# Patient Record
Sex: FEMALE | Race: WHITE | Employment: UNEMPLOYED | URBAN - METROPOLITAN AREA
[De-identification: names, ages, dates, MRNs, and addresses within clinical notes are randomized per-mention and may not be internally consistent; named-entity substitution may affect disease eponyms.]

---

## 2020-04-26 ENCOUNTER — HOSPITAL ENCOUNTER (EMERGENCY)
Age: 35
Discharge: HOME | End: 2020-04-26
Attending: EMERGENCY MEDICINE
Payer: MEDICAID

## 2020-04-26 VITALS
SYSTOLIC BLOOD PRESSURE: 152 MMHG | TEMPERATURE: 98.9 F | DIASTOLIC BLOOD PRESSURE: 117 MMHG | HEART RATE: 98 BPM | OXYGEN SATURATION: 99 % | WEIGHT: 144 LBS | RESPIRATION RATE: 18 BRPM | BODY MASS INDEX: 24.59 KG/M2 | HEIGHT: 64 IN

## 2020-04-26 PROCEDURE — 6370000000 HC RX 637 (ALT 250 FOR IP): Performed by: EMERGENCY MEDICINE

## 2020-04-26 PROCEDURE — 99282 EMERGENCY DEPT VISIT SF MDM: CPT

## 2020-04-26 RX ORDER — HYDROCODONE BITARTRATE AND ACETAMINOPHEN 5; 325 MG/1; MG/1
1 TABLET ORAL ONCE
Status: COMPLETED | OUTPATIENT
Start: 2020-04-26 | End: 2020-04-26

## 2020-04-26 RX ORDER — AMOXICILLIN AND CLAVULANATE POTASSIUM 875; 125 MG/1; MG/1
1 TABLET, FILM COATED ORAL 2 TIMES DAILY
Qty: 20 TABLET | Refills: 0 | Status: SHIPPED | OUTPATIENT
Start: 2020-04-26 | End: 2020-05-06

## 2020-04-26 RX ORDER — HYDROCHLOROTHIAZIDE 25 MG/1
25 TABLET ORAL PRN
Status: ON HOLD | COMMUNITY
End: 2020-07-03 | Stop reason: ALTCHOICE

## 2020-04-26 RX ORDER — AMOXICILLIN AND CLAVULANATE POTASSIUM 875; 125 MG/1; MG/1
1 TABLET, FILM COATED ORAL ONCE
Status: COMPLETED | OUTPATIENT
Start: 2020-04-26 | End: 2020-04-26

## 2020-04-26 RX ORDER — VENLAFAXINE 75 MG/1
75 TABLET ORAL DAILY
Status: ON HOLD | COMMUNITY
End: 2020-07-03 | Stop reason: ALTCHOICE

## 2020-04-26 RX ADMIN — HYDROCODONE BITARTRATE AND ACETAMINOPHEN 1 TABLET: 5; 325 TABLET ORAL at 21:17

## 2020-04-26 RX ADMIN — AMOXICILLIN AND CLAVULANATE POTASSIUM 1 TABLET: 875; 125 TABLET, FILM COATED ORAL at 21:17

## 2020-04-26 SDOH — HEALTH STABILITY: MENTAL HEALTH: HOW OFTEN DO YOU HAVE A DRINK CONTAINING ALCOHOL?: NEVER

## 2020-04-26 ASSESSMENT — PAIN DESCRIPTION - LOCATION
LOCATION: MOUTH
LOCATION: MOUTH

## 2020-04-26 ASSESSMENT — PAIN DESCRIPTION - ONSET: ONSET: PROGRESSIVE

## 2020-04-26 ASSESSMENT — PAIN DESCRIPTION - DESCRIPTORS: DESCRIPTORS: ACHING

## 2020-04-26 ASSESSMENT — PAIN SCALES - GENERAL
PAINLEVEL_OUTOF10: 8
PAINLEVEL_OUTOF10: 8

## 2020-04-26 ASSESSMENT — PAIN DESCRIPTION - PROGRESSION: CLINICAL_PROGRESSION: GRADUALLY WORSENING

## 2020-04-26 ASSESSMENT — PAIN DESCRIPTION - ORIENTATION
ORIENTATION: LEFT;LOWER
ORIENTATION: LEFT;LOWER

## 2020-04-26 ASSESSMENT — PAIN DESCRIPTION - FREQUENCY: FREQUENCY: CONTINUOUS

## 2020-04-26 ASSESSMENT — PAIN DESCRIPTION - DIRECTION: RADIATING_TOWARDS: NON RADIATING

## 2020-04-26 ASSESSMENT — PAIN DESCRIPTION - PAIN TYPE
TYPE: ACUTE PAIN
TYPE: ACUTE PAIN

## 2020-04-26 ASSESSMENT — PAIN - FUNCTIONAL ASSESSMENT: PAIN_FUNCTIONAL_ASSESSMENT: PREVENTS OR INTERFERES SOME ACTIVE ACTIVITIES AND ADLS

## 2020-06-30 ASSESSMENT — PAIN DESCRIPTION - PAIN TYPE: TYPE: ACUTE PAIN

## 2020-06-30 ASSESSMENT — PAIN DESCRIPTION - DESCRIPTORS: DESCRIPTORS: RADIATING

## 2020-06-30 ASSESSMENT — PAIN DESCRIPTION - FREQUENCY: FREQUENCY: CONTINUOUS

## 2020-06-30 ASSESSMENT — PAIN DESCRIPTION - PROGRESSION: CLINICAL_PROGRESSION: GRADUALLY WORSENING

## 2020-06-30 ASSESSMENT — PAIN DESCRIPTION - ORIENTATION: ORIENTATION: LOWER

## 2020-06-30 ASSESSMENT — PAIN SCALES - GENERAL: PAINLEVEL_OUTOF10: 9

## 2020-06-30 ASSESSMENT — PAIN DESCRIPTION - ONSET: ONSET: ON-GOING

## 2020-06-30 ASSESSMENT — PAIN DESCRIPTION - LOCATION: LOCATION: BACK

## 2020-07-01 ENCOUNTER — APPOINTMENT (OUTPATIENT)
Dept: GENERAL RADIOLOGY | Age: 35
DRG: 720 | End: 2020-07-01
Payer: MEDICAID

## 2020-07-01 ENCOUNTER — APPOINTMENT (OUTPATIENT)
Dept: CT IMAGING | Age: 35
DRG: 720 | End: 2020-07-01
Payer: MEDICAID

## 2020-07-01 ENCOUNTER — HOSPITAL ENCOUNTER (INPATIENT)
Age: 35
LOS: 4 days | Discharge: HOME | DRG: 720 | End: 2020-07-05
Attending: EMERGENCY MEDICINE | Admitting: INTERNAL MEDICINE
Payer: MEDICAID

## 2020-07-01 PROBLEM — M46.40 DISCITIS: Status: ACTIVE | Noted: 2020-07-01

## 2020-07-01 LAB
A/G RATIO: 0.8 (ref 1.1–2.2)
ALBUMIN SERPL-MCNC: 3.5 G/DL (ref 3.4–5)
ALP BLD-CCNC: 97 U/L (ref 40–129)
ALT SERPL-CCNC: 11 U/L (ref 10–40)
ANION GAP SERPL CALCULATED.3IONS-SCNC: 15 MMOL/L (ref 3–16)
AST SERPL-CCNC: 13 U/L (ref 15–37)
BASOPHILS ABSOLUTE: 0 K/UL (ref 0–0.2)
BASOPHILS RELATIVE PERCENT: 0.4 %
BILIRUB SERPL-MCNC: <0.2 MG/DL (ref 0–1)
BUN BLDV-MCNC: 13 MG/DL (ref 7–20)
CALCIUM SERPL-MCNC: 8.8 MG/DL (ref 8.3–10.6)
CHLORIDE BLD-SCNC: 99 MMOL/L (ref 99–110)
CO2: 23 MMOL/L (ref 21–32)
CREAT SERPL-MCNC: 0.6 MG/DL (ref 0.6–1.1)
EKG ATRIAL RATE: 65 BPM
EKG DIAGNOSIS: NORMAL
EKG P AXIS: 75 DEGREES
EKG P-R INTERVAL: 108 MS
EKG Q-T INTERVAL: 458 MS
EKG QRS DURATION: 98 MS
EKG QTC CALCULATION (BAZETT): 476 MS
EKG R AXIS: 53 DEGREES
EKG T AXIS: 48 DEGREES
EKG VENTRICULAR RATE: 65 BPM
EOSINOPHILS ABSOLUTE: 0.4 K/UL (ref 0–0.6)
EOSINOPHILS RELATIVE PERCENT: 3.1 %
GFR AFRICAN AMERICAN: >60
GFR NON-AFRICAN AMERICAN: >60
GLOBULIN: 4.4 G/DL
GLUCOSE BLD-MCNC: 134 MG/DL (ref 70–99)
HCG QUALITATIVE: NEGATIVE
HCT VFR BLD CALC: 40.5 % (ref 36–48)
HEMOGLOBIN: 13.2 G/DL (ref 12–16)
LACTIC ACID, SEPSIS: 2.3 MMOL/L (ref 0.4–1.9)
LACTIC ACID: 0.8 MMOL/L (ref 0.4–2)
LACTIC ACID: 0.9 MMOL/L (ref 0.4–2)
LACTIC ACID: 1.1 MMOL/L (ref 0.4–2)
LYMPHOCYTES ABSOLUTE: 3.4 K/UL (ref 1–5.1)
LYMPHOCYTES RELATIVE PERCENT: 27 %
MAGNESIUM: 2.3 MG/DL (ref 1.8–2.4)
MCH RBC QN AUTO: 28.1 PG (ref 26–34)
MCHC RBC AUTO-ENTMCNC: 32.6 G/DL (ref 31–36)
MCV RBC AUTO: 86.3 FL (ref 80–100)
MONOCYTES ABSOLUTE: 0.9 K/UL (ref 0–1.3)
MONOCYTES RELATIVE PERCENT: 7.4 %
NEUTROPHILS ABSOLUTE: 7.8 K/UL (ref 1.7–7.7)
NEUTROPHILS RELATIVE PERCENT: 62.1 %
PDW BLD-RTO: 13 % (ref 12.4–15.4)
PLATELET # BLD: 352 K/UL (ref 135–450)
PMV BLD AUTO: 8.9 FL (ref 5–10.5)
POTASSIUM REFLEX MAGNESIUM: 3.2 MMOL/L (ref 3.5–5.1)
PROCALCITONIN: 0.21 NG/ML (ref 0–0.15)
RBC # BLD: 4.69 M/UL (ref 4–5.2)
SEDIMENTATION RATE, ERYTHROCYTE: 38 MM/HR (ref 0–20)
SODIUM BLD-SCNC: 137 MMOL/L (ref 136–145)
TOTAL PROTEIN: 7.9 G/DL (ref 6.4–8.2)
WBC # BLD: 12.5 K/UL (ref 4–11)

## 2020-07-01 PROCEDURE — 6360000002 HC RX W HCPCS: Performed by: NURSE PRACTITIONER

## 2020-07-01 PROCEDURE — 71045 X-RAY EXAM CHEST 1 VIEW: CPT

## 2020-07-01 PROCEDURE — 2580000003 HC RX 258: Performed by: INTERNAL MEDICINE

## 2020-07-01 PROCEDURE — 2709999900 CT PERC ASP PARAVERT TISS/NUC PULP/INTERVERT DISC

## 2020-07-01 PROCEDURE — 96365 THER/PROPH/DIAG IV INF INIT: CPT

## 2020-07-01 PROCEDURE — 2580000003 HC RX 258: Performed by: NURSE PRACTITIONER

## 2020-07-01 PROCEDURE — 87070 CULTURE OTHR SPECIMN AEROBIC: CPT

## 2020-07-01 PROCEDURE — 87205 SMEAR GRAM STAIN: CPT

## 2020-07-01 PROCEDURE — 83605 ASSAY OF LACTIC ACID: CPT

## 2020-07-01 PROCEDURE — 6360000002 HC RX W HCPCS: Performed by: INTERNAL MEDICINE

## 2020-07-01 PROCEDURE — 87040 BLOOD CULTURE FOR BACTERIA: CPT

## 2020-07-01 PROCEDURE — 83735 ASSAY OF MAGNESIUM: CPT

## 2020-07-01 PROCEDURE — 6360000002 HC RX W HCPCS: Performed by: RADIOLOGY

## 2020-07-01 PROCEDURE — 80053 COMPREHEN METABOLIC PANEL: CPT

## 2020-07-01 PROCEDURE — 1200000000 HC SEMI PRIVATE

## 2020-07-01 PROCEDURE — 96375 TX/PRO/DX INJ NEW DRUG ADDON: CPT

## 2020-07-01 PROCEDURE — 84145 PROCALCITONIN (PCT): CPT

## 2020-07-01 PROCEDURE — 6360000002 HC RX W HCPCS: Performed by: EMERGENCY MEDICINE

## 2020-07-01 PROCEDURE — 72132 CT LUMBAR SPINE W/DYE: CPT

## 2020-07-01 PROCEDURE — 36415 COLL VENOUS BLD VENIPUNCTURE: CPT

## 2020-07-01 PROCEDURE — 0QB03ZX EXCISION OF LUMBAR VERTEBRA, PERCUTANEOUS APPROACH, DIAGNOSTIC: ICD-10-PCS | Performed by: RADIOLOGY

## 2020-07-01 PROCEDURE — 85652 RBC SED RATE AUTOMATED: CPT

## 2020-07-01 PROCEDURE — 0QB13ZX EXCISION OF SACRUM, PERCUTANEOUS APPROACH, DIAGNOSTIC: ICD-10-PCS | Performed by: RADIOLOGY

## 2020-07-01 PROCEDURE — 87186 SC STD MICRODIL/AGAR DIL: CPT

## 2020-07-01 PROCEDURE — 6360000002 HC RX W HCPCS: Performed by: FAMILY MEDICINE

## 2020-07-01 PROCEDURE — 85025 COMPLETE CBC W/AUTO DIFF WBC: CPT

## 2020-07-01 PROCEDURE — 77012 CT SCAN FOR NEEDLE BIOPSY: CPT

## 2020-07-01 PROCEDURE — 84703 CHORIONIC GONADOTROPIN ASSAY: CPT

## 2020-07-01 PROCEDURE — 6370000000 HC RX 637 (ALT 250 FOR IP): Performed by: FAMILY MEDICINE

## 2020-07-01 PROCEDURE — 87077 CULTURE AEROBIC IDENTIFY: CPT

## 2020-07-01 PROCEDURE — 6360000004 HC RX CONTRAST MEDICATION: Performed by: EMERGENCY MEDICINE

## 2020-07-01 PROCEDURE — 87075 CULTR BACTERIA EXCEPT BLOOD: CPT

## 2020-07-01 PROCEDURE — 93010 ELECTROCARDIOGRAM REPORT: CPT | Performed by: INTERNAL MEDICINE

## 2020-07-01 PROCEDURE — 93005 ELECTROCARDIOGRAM TRACING: CPT | Performed by: NURSE PRACTITIONER

## 2020-07-01 PROCEDURE — 99285 EMERGENCY DEPT VISIT HI MDM: CPT

## 2020-07-01 PROCEDURE — 99255 IP/OBS CONSLTJ NEW/EST HI 80: CPT | Performed by: INTERNAL MEDICINE

## 2020-07-01 RX ORDER — KETOROLAC TROMETHAMINE 15 MG/ML
15 INJECTION, SOLUTION INTRAMUSCULAR; INTRAVENOUS EVERY 6 HOURS PRN
Status: DISCONTINUED | OUTPATIENT
Start: 2020-07-01 | End: 2020-07-05 | Stop reason: HOSPADM

## 2020-07-01 RX ORDER — KETOROLAC TROMETHAMINE 30 MG/ML
30 INJECTION, SOLUTION INTRAMUSCULAR; INTRAVENOUS EVERY 6 HOURS PRN
Status: DISCONTINUED | OUTPATIENT
Start: 2020-07-01 | End: 2020-07-01

## 2020-07-01 RX ORDER — 0.9 % SODIUM CHLORIDE 0.9 %
30 INTRAVENOUS SOLUTION INTRAVENOUS ONCE
Status: COMPLETED | OUTPATIENT
Start: 2020-07-01 | End: 2020-07-01

## 2020-07-01 RX ORDER — MORPHINE SULFATE 2 MG/ML
2 INJECTION, SOLUTION INTRAMUSCULAR; INTRAVENOUS ONCE
Status: COMPLETED | OUTPATIENT
Start: 2020-07-01 | End: 2020-07-01

## 2020-07-01 RX ORDER — MIDAZOLAM HYDROCHLORIDE 1 MG/ML
INJECTION INTRAMUSCULAR; INTRAVENOUS DAILY PRN
Status: COMPLETED | OUTPATIENT
Start: 2020-07-01 | End: 2020-07-01

## 2020-07-01 RX ORDER — LORAZEPAM 2 MG/ML
INJECTION INTRAMUSCULAR DAILY PRN
Status: COMPLETED | OUTPATIENT
Start: 2020-07-01 | End: 2020-07-01

## 2020-07-01 RX ORDER — SODIUM CHLORIDE 9 MG/ML
INJECTION, SOLUTION INTRAVENOUS CONTINUOUS
Status: DISCONTINUED | OUTPATIENT
Start: 2020-07-01 | End: 2020-07-05 | Stop reason: HOSPADM

## 2020-07-01 RX ORDER — METAXALONE 800 MG/1
800 TABLET ORAL EVERY 8 HOURS PRN
Status: DISCONTINUED | OUTPATIENT
Start: 2020-07-01 | End: 2020-07-04

## 2020-07-01 RX ORDER — LABETALOL HYDROCHLORIDE 5 MG/ML
10 INJECTION, SOLUTION INTRAVENOUS EVERY 4 HOURS PRN
Status: DISCONTINUED | OUTPATIENT
Start: 2020-07-01 | End: 2020-07-05 | Stop reason: HOSPADM

## 2020-07-01 RX ORDER — SODIUM CHLORIDE 0.9 % (FLUSH) 0.9 %
10 SYRINGE (ML) INJECTION PRN
Status: DISCONTINUED | OUTPATIENT
Start: 2020-07-01 | End: 2020-07-05 | Stop reason: HOSPADM

## 2020-07-01 RX ORDER — ACETAMINOPHEN 500 MG
1000 TABLET ORAL EVERY 8 HOURS SCHEDULED
Status: DISCONTINUED | OUTPATIENT
Start: 2020-07-01 | End: 2020-07-02

## 2020-07-01 RX ORDER — KETOROLAC TROMETHAMINE 30 MG/ML
30 INJECTION, SOLUTION INTRAMUSCULAR; INTRAVENOUS ONCE
Status: COMPLETED | OUTPATIENT
Start: 2020-07-01 | End: 2020-07-01

## 2020-07-01 RX ORDER — ACETAMINOPHEN 325 MG/1
650 TABLET ORAL EVERY 4 HOURS PRN
Status: DISCONTINUED | OUTPATIENT
Start: 2020-07-01 | End: 2020-07-01

## 2020-07-01 RX ORDER — POTASSIUM CHLORIDE 750 MG/1
40 TABLET, FILM COATED, EXTENDED RELEASE ORAL ONCE
Status: COMPLETED | OUTPATIENT
Start: 2020-07-01 | End: 2020-07-01

## 2020-07-01 RX ORDER — MORPHINE SULFATE 4 MG/ML
4 INJECTION, SOLUTION INTRAMUSCULAR; INTRAVENOUS EVERY 4 HOURS PRN
Status: DISCONTINUED | OUTPATIENT
Start: 2020-07-01 | End: 2020-07-03

## 2020-07-01 RX ORDER — ONDANSETRON 2 MG/ML
4 INJECTION INTRAMUSCULAR; INTRAVENOUS EVERY 4 HOURS PRN
Status: DISCONTINUED | OUTPATIENT
Start: 2020-07-01 | End: 2020-07-05 | Stop reason: HOSPADM

## 2020-07-01 RX ORDER — LORAZEPAM 1 MG/1
1 TABLET ORAL EVERY 4 HOURS PRN
Status: DISCONTINUED | OUTPATIENT
Start: 2020-07-01 | End: 2020-07-04

## 2020-07-01 RX ORDER — ACETAMINOPHEN 650 MG/1
650 SUPPOSITORY RECTAL EVERY 4 HOURS PRN
Status: DISCONTINUED | OUTPATIENT
Start: 2020-07-01 | End: 2020-07-01

## 2020-07-01 RX ORDER — SODIUM CHLORIDE 0.9 % (FLUSH) 0.9 %
10 SYRINGE (ML) INJECTION EVERY 12 HOURS SCHEDULED
Status: DISCONTINUED | OUTPATIENT
Start: 2020-07-01 | End: 2020-07-05 | Stop reason: HOSPADM

## 2020-07-01 RX ORDER — GABAPENTIN 100 MG/1
100 CAPSULE ORAL 3 TIMES DAILY
Status: DISCONTINUED | OUTPATIENT
Start: 2020-07-01 | End: 2020-07-02

## 2020-07-01 RX ORDER — FENTANYL CITRATE 50 UG/ML
INJECTION, SOLUTION INTRAMUSCULAR; INTRAVENOUS DAILY PRN
Status: COMPLETED | OUTPATIENT
Start: 2020-07-01 | End: 2020-07-01

## 2020-07-01 RX ORDER — POLYETHYLENE GLYCOL 3350 17 G/17G
17 POWDER, FOR SOLUTION ORAL DAILY PRN
Status: DISCONTINUED | OUTPATIENT
Start: 2020-07-01 | End: 2020-07-05 | Stop reason: HOSPADM

## 2020-07-01 RX ORDER — VENLAFAXINE 37.5 MG/1
75 TABLET ORAL DAILY
Status: DISCONTINUED | OUTPATIENT
Start: 2020-07-01 | End: 2020-07-05 | Stop reason: HOSPADM

## 2020-07-01 RX ORDER — GABAPENTIN 100 MG/1
100 CAPSULE ORAL 3 TIMES DAILY
Status: DISCONTINUED | OUTPATIENT
Start: 2020-07-01 | End: 2020-07-01

## 2020-07-01 RX ADMIN — CEFEPIME HYDROCHLORIDE 2 G: 2 INJECTION, POWDER, FOR SOLUTION INTRAVENOUS at 18:21

## 2020-07-01 RX ADMIN — LORAZEPAM 1 MG: 2 INJECTION INTRAMUSCULAR; INTRAVENOUS at 12:23

## 2020-07-01 RX ADMIN — LORAZEPAM 1 MG: 1 TABLET ORAL at 17:09

## 2020-07-01 RX ADMIN — FENTANYL CITRATE 25 MCG: 50 INJECTION INTRAMUSCULAR; INTRAVENOUS at 12:21

## 2020-07-01 RX ADMIN — HYDROMORPHONE HYDROCHLORIDE 0.5 MG: 1 INJECTION, SOLUTION INTRAMUSCULAR; INTRAVENOUS; SUBCUTANEOUS at 07:45

## 2020-07-01 RX ADMIN — HYDROMORPHONE HYDROCHLORIDE 1 MG: 1 INJECTION, SOLUTION INTRAMUSCULAR; INTRAVENOUS; SUBCUTANEOUS at 13:17

## 2020-07-01 RX ADMIN — ONDANSETRON 4 MG: 2 INJECTION INTRAMUSCULAR; INTRAVENOUS at 11:37

## 2020-07-01 RX ADMIN — SODIUM CHLORIDE: 9 INJECTION, SOLUTION INTRAVENOUS at 22:20

## 2020-07-01 RX ADMIN — IOPAMIDOL 75 ML: 755 INJECTION, SOLUTION INTRAVENOUS at 03:57

## 2020-07-01 RX ADMIN — LORAZEPAM 1 MG: 1 TABLET ORAL at 09:28

## 2020-07-01 RX ADMIN — HYDROMORPHONE HYDROCHLORIDE 1 MG: 1 INJECTION, SOLUTION INTRAMUSCULAR; INTRAVENOUS; SUBCUTANEOUS at 09:28

## 2020-07-01 RX ADMIN — METAXALONE 800 MG: 800 TABLET ORAL at 18:46

## 2020-07-01 RX ADMIN — MORPHINE SULFATE 2 MG: 2 INJECTION, SOLUTION INTRAMUSCULAR; INTRAVENOUS at 04:12

## 2020-07-01 RX ADMIN — LORAZEPAM 1 MG: 1 TABLET ORAL at 22:16

## 2020-07-01 RX ADMIN — POTASSIUM CHLORIDE 40 MEQ: 750 TABLET, FILM COATED, EXTENDED RELEASE ORAL at 09:28

## 2020-07-01 RX ADMIN — FENTANYL CITRATE 50 MCG: 50 INJECTION INTRAMUSCULAR; INTRAVENOUS at 12:06

## 2020-07-01 RX ADMIN — KETOROLAC TROMETHAMINE 30 MG: 30 INJECTION, SOLUTION INTRAMUSCULAR at 17:05

## 2020-07-01 RX ADMIN — HYDROMORPHONE HYDROCHLORIDE 1 MG: 1 INJECTION, SOLUTION INTRAMUSCULAR; INTRAVENOUS; SUBCUTANEOUS at 20:46

## 2020-07-01 RX ADMIN — SODIUM CHLORIDE, PRESERVATIVE FREE 10 ML: 5 INJECTION INTRAVENOUS at 20:46

## 2020-07-01 RX ADMIN — FENTANYL CITRATE 25 MCG: 50 INJECTION INTRAMUSCULAR; INTRAVENOUS at 12:17

## 2020-07-01 RX ADMIN — KETOROLAC TROMETHAMINE 30 MG: 30 INJECTION, SOLUTION INTRAMUSCULAR at 04:12

## 2020-07-01 RX ADMIN — MIDAZOLAM 1 MG: 1 INJECTION INTRAMUSCULAR; INTRAVENOUS at 12:17

## 2020-07-01 RX ADMIN — MORPHINE SULFATE 4 MG: 4 INJECTION, SOLUTION INTRAMUSCULAR; INTRAVENOUS at 22:15

## 2020-07-01 RX ADMIN — SODIUM CHLORIDE: 9 INJECTION, SOLUTION INTRAVENOUS at 09:18

## 2020-07-01 RX ADMIN — SODIUM CHLORIDE 2040 ML: 9 INJECTION, SOLUTION INTRAVENOUS at 04:12

## 2020-07-01 RX ADMIN — HYDROMORPHONE HYDROCHLORIDE 1 MG: 1 INJECTION, SOLUTION INTRAMUSCULAR; INTRAVENOUS; SUBCUTANEOUS at 17:05

## 2020-07-01 RX ADMIN — SODIUM CHLORIDE, PRESERVATIVE FREE 10 ML: 5 INJECTION INTRAVENOUS at 09:19

## 2020-07-01 RX ADMIN — CEFEPIME HYDROCHLORIDE 2 G: 2 INJECTION, POWDER, FOR SOLUTION INTRAVENOUS at 04:12

## 2020-07-01 RX ADMIN — SODIUM CHLORIDE: 9 INJECTION, SOLUTION INTRAVENOUS at 13:17

## 2020-07-01 RX ADMIN — VANCOMYCIN HYDROCHLORIDE 1000 MG: 1 INJECTION, POWDER, LYOPHILIZED, FOR SOLUTION INTRAVENOUS at 20:47

## 2020-07-01 RX ADMIN — MIDAZOLAM 1 MG: 1 INJECTION INTRAMUSCULAR; INTRAVENOUS at 12:05

## 2020-07-01 RX ADMIN — MORPHINE SULFATE 4 MG: 4 INJECTION, SOLUTION INTRAMUSCULAR; INTRAVENOUS at 17:57

## 2020-07-01 RX ADMIN — KETOROLAC TROMETHAMINE 30 MG: 30 INJECTION, SOLUTION INTRAMUSCULAR at 11:36

## 2020-07-01 RX ADMIN — GABAPENTIN 100 MG: 100 CAPSULE ORAL at 18:20

## 2020-07-01 ASSESSMENT — PAIN SCALES - GENERAL
PAINLEVEL_OUTOF10: 10
PAINLEVEL_OUTOF10: 6
PAINLEVEL_OUTOF10: 10
PAINLEVEL_OUTOF10: 9
PAINLEVEL_OUTOF10: 7
PAINLEVEL_OUTOF10: 10
PAINLEVEL_OUTOF10: 10
PAINLEVEL_OUTOF10: 9
PAINLEVEL_OUTOF10: 10
PAINLEVEL_OUTOF10: 10
PAINLEVEL_OUTOF10: 9
PAINLEVEL_OUTOF10: 10

## 2020-07-01 ASSESSMENT — PAIN DESCRIPTION - DESCRIPTORS
DESCRIPTORS: SHARP
DESCRIPTORS: ACHING
DESCRIPTORS: SHARP
DESCRIPTORS: ACHING;BURNING
DESCRIPTORS: SHARP
DESCRIPTORS: STABBING
DESCRIPTORS: SHARP
DESCRIPTORS: SPASM
DESCRIPTORS: SPASM;SHARP
DESCRIPTORS: SPASM;SHARP
DESCRIPTORS: SHARP
DESCRIPTORS: TIGHTNESS;SHARP

## 2020-07-01 ASSESSMENT — PAIN DESCRIPTION - LOCATION
LOCATION: BACK

## 2020-07-01 ASSESSMENT — PAIN DESCRIPTION - ONSET
ONSET: ON-GOING

## 2020-07-01 ASSESSMENT — PAIN DESCRIPTION - PAIN TYPE
TYPE: ACUTE PAIN

## 2020-07-01 ASSESSMENT — PAIN DESCRIPTION - PROGRESSION
CLINICAL_PROGRESSION: NOT CHANGED

## 2020-07-01 ASSESSMENT — ENCOUNTER SYMPTOMS
WHEEZING: 0
PHOTOPHOBIA: 0
EYE DISCHARGE: 0
CHEST TIGHTNESS: 0
BACK PAIN: 1
CHOKING: 0
APNEA: 0
SHORTNESS OF BREATH: 0
EYE PAIN: 0
ABDOMINAL DISTENTION: 0
COUGH: 0
EYE ITCHING: 0
STRIDOR: 0
EYE REDNESS: 0

## 2020-07-01 ASSESSMENT — PAIN DESCRIPTION - FREQUENCY
FREQUENCY: CONTINUOUS

## 2020-07-01 ASSESSMENT — PAIN - FUNCTIONAL ASSESSMENT
PAIN_FUNCTIONAL_ASSESSMENT: ACTIVITIES ARE NOT PREVENTED

## 2020-07-01 ASSESSMENT — PAIN DESCRIPTION - DIRECTION: RADIATING_TOWARDS: BUTTOCKS

## 2020-07-01 NOTE — CARE COORDINATION
7/1 Patient is not in room for assessment. CM called [place] [employer] to verify insurance. Patient has [address] At [facility]. Her RID # is [id number]. Customer Service is [phone].  Electronically signed by Annie Lee RN on 7/1/2020 at 4:26 PM

## 2020-07-01 NOTE — CONSULTS
Clinical Pharmacy Note  Vancomycin Consult    Pharmacy consult received for one-time dose of vancomycin in the Emergency Department per Angelique Dacosta CNP. Ht Readings from Last 1 Encounters:   07/01/20 5' 4\" (1.626 m)        Wt Readings from Last 1 Encounters:   07/01/20 150 lb (68 kg)         Assessment/Plan:   Vancomycin 1250 mg x 1 in ED.  If Vancomycin is to continue on admission and pharmacy is to manage dosing, please re-consult with admission orders.         Abhishek Bustillo, PharmD  7/1/2020 3:36 AM

## 2020-07-01 NOTE — ED NOTES
Patient straight stuck to R wrist for second set of blood cultures     Victor M Sadler RN  07/01/20 [id number]

## 2020-07-01 NOTE — PROGRESS NOTES
Admitted 04:45 am 07/01/2020 for Discitis/Osteomyelitis. Holding antibiotic therapy pending culture samples by Neurosurgery. Pt with a H/o IVDA, left  AMA while being treated for the same last week.     Davis Ross MD

## 2020-07-01 NOTE — ED NOTES
RN at bedside to draw morning labs, patient not in room, patient disconnected herself from PIV fluid and BP cuff and walked to bathroom, found walking back to room by this RN immediately asking for pain meds, IVF restarted patient sitting in bed playing on phone     Missael Guillen, LUCIE  07/01/20 0039

## 2020-07-01 NOTE — PRE SEDATION
Sedation Pre-Procedure Note    Patient Name: Gardenia Scales   YOB: 1985  Room/Bed: L6W-5732/1692-03  Medical Record Number: [id number]  Date: 2020   Time: 12:01 PM       Indication:  Discitis    Consent: I have discussed with the patient and/or the patient representative the indication, alternatives, and the possible risks and/or complications of the planned procedure and the anesthesia methods. The patient and/or patient representative appear to understand and agree to proceed. Vital Signs:   Vitals:    20 1159   BP:    Pulse: (P) 60   Resp: (P) 14   Temp:    SpO2: (P) 100%       Past Medical History:   has a past medical history of Herniated lumbar intervertebral disc. Past Surgical History:   has a past surgical history that includes Cholecystectomy;  section; and Carpal tunnel release (Bilateral). Medications:   Scheduled Meds:    venlafaxine  75 mg Oral Daily    sodium chloride flush  10 mL Intravenous 2 times per day     Continuous Infusions:    sodium chloride 75 mL/hr at 20 0918     PRN Meds: sodium chloride flush, acetaminophen **OR** acetaminophen, polyethylene glycol, ondansetron, ketorolac, HYDROmorphone, LORazepam  Home Meds:   Prior to Admission medications    Medication Sig Start Date End Date Taking? Authorizing Provider   venlafaxine (EFFEXOR) 75 MG tablet Take 75 mg by mouth daily    Historical Provider, MD   hydroCHLOROthiazide (HYDRODIURIL) 25 MG tablet Take 25 mg by mouth as needed    Historical Provider, MD     Coumadin Use Last 7 Days:  no  Antiplatelet drug therapy use last 7 days: no  Other anticoagulant use last 7 days: no  Additional Medication Information:        Pre-Sedation Documentation and Exam:   I have reviewed the patient's history and review of systems.     Mallampati Airway Assessment:  Mallampati Class II - (soft palate, fauces & uvula are visible)    Prior History of Anesthesia Complications:   none    ASA Classification:  Class 2 - A normal healthy patient with mild systemic disease    Sedation/ Anesthesia Plan:   intravenous sedation    Medications Planned:   midazolam (Versed) intravenously and fentanyl intravenously    Patient is an appropriate candidate for plan of sedation: yes    Electronically signed by Mayur Rose MD on 7/1/2020 at 12:01 PM

## 2020-07-01 NOTE — PROGRESS NOTES
Attempted to see patient, she was in CT guided aspiration. CT lumbar spine demonstrated evidence of L5-S1 discitis. Awainting CT guided aspiration results and awaiting MRI lumbar spine w/w/o contrast for further evaluation. Will need ID consult as well. Formal consult to follow.

## 2020-07-01 NOTE — ED PROVIDER NOTES
[facility]      Pt Name: Elizabet Zheng  MRN: [id number]  [place] 1985  Date of evaluation: 6/30/2020  Provider: Belle You MD    CHIEF COMPLAINT       Back pain    HISTORY OF PRESENT ILLNESS    Elizabet Zheng is a 28 y.o. female who presents to the emergency department with back pain. 6 week history back pain and trouble walking. Was diagnosed with discitis at [facility] recently and left AMA. Pain is acute on chronic 9/10 sharp and constant in nature. Has been taking OTC medications with minimal relief. Nothing makes symptoms better but movement makes symptoms worse. This has never happened before. No other associated symptoms other than previously mentioned. Deneis weakness, saddle numbness, or loss of bowel or bladder function. Nursing Notes were reviewed. Including nursing noted for FM, Surgical History, Past Medical History, Social History, vitals, and allergies; agree with all. REVIEW OF SYSTEMS       Review of Systems   Constitutional: Positive for appetite change, fatigue and fever. Negative for activity change, chills, diaphoresis and unexpected weight change. HENT: Negative for congestion, dental problem, drooling and ear discharge. Eyes: Negative for photophobia, pain, discharge, redness and itching. Respiratory: Negative for apnea, cough, choking, chest tightness, shortness of breath, wheezing and stridor. Cardiovascular: Negative for chest pain and leg swelling. Gastrointestinal: Negative for abdominal distention. Endocrine: Negative for polyphagia and polyuria. Genitourinary: Negative for vaginal bleeding, vaginal discharge and vaginal pain. Musculoskeletal: Positive for back pain. Negative for arthralgias. Neurological: Negative for dizziness, facial asymmetry and headaches. Hematological: Negative for adenopathy. Does not bruise/bleed easily.    Psychiatric/Behavioral: Negative for agitation, behavioral problems, confusion, decreased concentration, dysphoric mood, hallucinations, self-injury, sleep disturbance and suicidal ideas. The patient is not nervous/anxious and is not hyperactive. Except as noted above the remainder of the review of systems was reviewed and negative. PAST MEDICAL HISTORY     Past Medical History:   Diagnosis Date    Herniated lumbar intervertebral disc        SURGICAL HISTORY       Past Surgical History:   Procedure Laterality Date    CARPAL TUNNEL RELEASE Bilateral      SECTION      CHOLECYSTECTOMY         CURRENT MEDICATIONS       Previous Medications    HYDROCHLOROTHIAZIDE (HYDRODIURIL) 25 MG TABLET    Take 25 mg by mouth as needed    VENLAFAXINE (EFFEXOR) 75 MG TABLET    Take 75 mg by mouth daily       ALLERGIES     Fluconazole and Levofloxacin    FAMILY HISTORY      History reviewed. No pertinent family history.     SOCIAL HISTORY       Social History     Socioeconomic History    Marital status: Single     Spouse name: None    Number of children: None    Years of education: None    Highest education level: None   Occupational History    None   Social Needs    Financial resource strain: None    Food insecurity     Worry: None     Inability: None    Transportation needs     Medical: None     Non-medical: None   Tobacco Use    Smoking status: Current Every Day Smoker     Packs/day: 0.50    Smokeless tobacco: Never Used   Substance and Sexual Activity    Alcohol use: Never     Frequency: Never    Drug use: Yes     Types: Marijuana     Comment: occasional    Sexual activity: None   Lifestyle    Physical activity     Days per week: None     Minutes per session: None    Stress: None   Relationships    Social connections     Talks on phone: None     Gets together: None     Attends Zoroastrian service: None     Active member of club or organization: None     Attends meetings of clubs or organizations: None     Relationship status: None    Intimate partner violence     Fear of current or ex partner: None     Emotionally abused: None     Physically abused: None     Forced sexual activity: None   Other Topics Concern    None   Social History Narrative    None       PHYSICAL EXAM       ED Triage Vitals   BP Temp Temp Source Pulse Resp SpO2 Height Weight   06/30/20 2248 06/30/20 2248 06/30/20 2248 06/30/20 2248 06/30/20 2248 06/30/20 2248 07/01/20 0331 07/01/20 0331   (!) 154/104 98.4 °F (36.9 °C) Oral 96 15 99 % 5' 4\" (1.626 m) 150 lb (68 kg)       Physical Exam  Vitals signs and nursing note reviewed. Constitutional:       General: She is not in acute distress. Appearance: She is well-developed. She is not ill-appearing, toxic-appearing or diaphoretic. HENT:      Head: Normocephalic and atraumatic. Right Ear: External ear normal.      Left Ear: External ear normal.   Eyes:      General:         Right eye: No discharge. Left eye: No discharge. Conjunctiva/sclera: Conjunctivae normal.      Pupils: Pupils are equal, round, and reactive to light. Neck:      Musculoskeletal: Normal range of motion and neck supple. Cardiovascular:      Rate and Rhythm: Normal rate and regular rhythm. Heart sounds: No murmur. Pulmonary:      Effort: Pulmonary effort is normal. No respiratory distress. Breath sounds: Normal breath sounds. No wheezing or rales. Abdominal:      General: Bowel sounds are normal. There is no distension. Palpations: Abdomen is soft. There is no mass. Tenderness: There is no abdominal tenderness. There is no guarding or rebound. Genitourinary:     Comments: Deferred  Musculoskeletal: Normal range of motion. General: Tenderness present. Skin:     General: Skin is warm. Findings: No erythema or rash. Comments: TTP over lumbar spine   Neurological:      Mental Status: She is alert and oriented to person, place, and time. She is not disoriented.       Cranial Nerves: No cranial nerve program.  Efforts were made to edit the dictations but occasionally words are mis-transcribed.)    Gloria Zuniga MD(electronically signed)  Attending Emergency Physician          Gloria Zuniga MD  07/01/20 [id number]

## 2020-07-01 NOTE — PROGRESS NOTES
Pt complaining of pain, however too early for pain medications. Jennifer DAVILA. Stated to give toradol dose early.

## 2020-07-01 NOTE — ED NOTES
RN at bedside to round on patient, patient laying in the dark playing on phone.  Patient complaints of pain still, wanting more pain meds     Addi Ochoa RN  07/01/20 [id number]

## 2020-07-01 NOTE — H&P
Hospital Medicine History & Physical      PCP: No primary care provider on file. Date of Admission: 2020    Date of Service: Admitted on 2020    Chief Complaint: Back pain    History Of Present Illness:    Ms. Brandy Briones is a 72-year-old lady with history of IV drug use who presents to [facility] emergency department with a multi week history of progressive lower back pain. A week ago she did present to [facility] emergency department and diagnosed with L5-S1 discitis and osteomyelitis. She also had an MRI that showed potential evolving phlegmon in the epidural region. She did leave the emergency department  [address] and was given some doses of levofloxacin and rifampin. Patient presents back today with persistent worsening symptoms. No saddle anesthesia muscular weakness or bowel bladder incontinence. In the emergency department vital signs were within normal limits except for mild range hypertension. Labs were unremarkable except for lactic acid 2.3, procalcitonin of 0.21 and WBC of 12.5. ESR was 38. Blood cultures were drawn but antibiotics were held in hopes of getting lumbar sample prior to initiation of therapy. Medicine was called for admission    Past Medical History:        Diagnosis Date    Herniated lumbar intervertebral disc        Past Surgical History:        Procedure Laterality Date    CARPAL TUNNEL RELEASE Bilateral      SECTION      CHOLECYSTECTOMY         Medications Prior to Admission:    Prior to Admission medications    Medication Sig Start Date End Date Taking?  Authorizing Provider   venlafaxine (EFFEXOR) 75 MG tablet Take 75 mg by mouth daily    Historical Provider, MD   hydroCHLOROthiazide (HYDRODIURIL) 25 MG tablet Take 25 mg by mouth as needed    Historical Provider, MD       Allergies:  Fluconazole and Levofloxacin    Social History:      TOBACCO:   reports that she has been smoking. She has been smoking about 0.50 packs per day. She has never used smokeless tobacco.  ETOH:   reports no history of alcohol use. Family History:  Reviewed in detail and negative for DM, Early CAD, Cancer, CVA. Positive as follows:    History reviewed. No pertinent family history. REVIEW OF SYSTEMS:   Positive for lower back pain and as noted in the HPI. All other systems reviewed and negative. PHYSICAL EXAM:    BP (!) 151/100   Pulse 78   Temp 97.8 °F (36.6 °C) (Oral)   Resp 16   Ht 5' 4\" (1.626 m)   Wt 150 lb (68 kg)   SpO2 95%   BMI 25.75 kg/m²     General appearance: No apparent distress appears stated age and cooperative. HEENT Normal cephalic, atraumatic without obvious deformity. Pupils equal, round, and reactive to light. Extra ocular muscles intact. Conjunctivae/corneas clear. Neck: Supple, No jugular venous distention/bruits. Trachea midline without thyromegaly or adenopathy with full range of motion. Lungs: Clear to auscultation, bilaterally without Rales/Wheezes/Rhonchi with good respiratory effort. Heart: Regular rate and rhythm with Normal S1/S2 without murmurs, rubs or gallops, point of maximum impulse non-displaced  Abdomen: Soft, non-tender or non-distended without rigidity or guarding and positive bowel sounds all four quadrants. Extremities: No clubbing, cyanosis, or edema bilaterally. Full range of motion without deformity and normal gait intact. Skin: Skin color, texture, turgor normal.  No rashes or lesions. Neurologic: Alert and oriented X 3, neurovascularly intact with sensory/motor intact upper extremities/lower extremities, bilaterally. Tenderness over lower lumbar spinal area and paraspinal muscles. Cranial nerves: II-XII intact, grossly non-focal.  Mental status: Alert, oriented, thought content appropriate.   Capillary Refill: Acceptable  < 3 seconds  Peripheral Pulses: +3 Easily felt, not easily obliterated with pressure      CBC   Recent Labs     07/01/20 0300   WBC 12.5*   HGB 13.2   HCT 40.5         RENAL  Recent Labs     07/01/20 0300      K 3.2*   CL 99   CO2 23   BUN 13   CREATININE 0.6     LFT'S  Recent Labs     07/01/20 0300   AST 13*   ALT 11   BILITOT <0.2   ALKPHOS 97     COAG  No results for input(s): INR in the last 72 hours. CARDIAC ENZYMES  No results for input(s): CKTOTAL, CKMB, CKMBINDEX, TROPONINI in the last 72 hours. U/A:  No results found for: NITRITE, COLORU, WBCUA, RBCUA, MUCUS, BACTERIA, CLARITYU, SPECGRAV, LEUKOCYTESUR, BLOODU, GLUCOSEU, AMORPHOUS    ABG  No results found for: QST5OUS, BEART, I6PBPUKM, PHART, THGBART, ZQX9GTP, PO2ART, ZIB7UFC        Active Hospital Problems    Diagnosis Date Noted    Discitis [M46.40] 07/01/2020         PHYSICIANS CERTIFICATION:    I certify that Cora Rodriguez is expected to be hospitalized for more than 2 midnights based on the following assessment and plan:      ASSESSMENT/PLAN:    L5-S1 discitis and osteomyelitis:  -Intervertebral sampling with CT-guided interventional radiology ordered.  -Holding antibiotic therapy until sample obtained. ID consulted to guide antibiotic recommendations  -Blood cultures ordered. If return positive will need TTE to rule out endocarditis  -Does not currently have neurologic symptoms of cord compression at this time.  -Neuro surgery consulted for further evaluation   -Patient had MRI on 6/25, will defer to neurosurgery if they want a repeat MRI at this time    IV drug use history:  -Patient threatened to leave AMA this morning before obtaining culture sample. Low-dose Dilaudid ordered. Low-dose Ativan also ordered for anxiety and potential withdrawal from polysubstance abuse  -Wean controlled substance medications on a daily basis    Lactic acidosis:  -Likely due to infection  -Resolved with fluids.   Continue to monitor    DVT Prophylaxis:   Diet: DIET GENERAL;  Code Status: Full Code  PT/OT Eval Status: Not applicable    Zen English MD    Thank you No primary care provider on file. for the opportunity to be involved in this patient's care. If you have any questions or concerns please feel free to contact me at [phone].

## 2020-07-01 NOTE — CONSULTS
Infectious Diseases Inpatient Consult Note      Reason for Consult:  Lumbar diskitis and IVDA with back pain    Requesting Physician:  Dr. Fabi Jennings     Primary Care Physician:  No primary care provider on file. History Obtained From:  Pt and Medical records     CHIEF COMPLAINT:     Chief Complaint   Patient presents with    Fatigue     x6 weeks; difficulty walking; spinal infection per MRI at , patient signed out AMA from [address] [place] [address]:  28 y.o. Woman with IVDA uses Heroin last use before admit was seen at [facility] for BACK pain and MRI L spine done on  abnormal with diskitis and left AMA and now presented here for admission and s/p IR guided aspiration CT L spine here abnormal with L5 and S1 diskitis and WBC elevation noted she was d/c to home on oral CIPRO and Rifampin per  notes care every where. Past Medical History:    Past Medical History:   Diagnosis Date    Herniated lumbar intervertebral disc        Past Surgical History:    Past Surgical History:   Procedure Laterality Date    CARPAL TUNNEL RELEASE Bilateral      SECTION      CHOLECYSTECTOMY         Current Medications:    No outpatient medications have been marked as taking for the 20 encounter [facility] Encounter). Allergies:  Fluconazole and Levofloxacin    Immunizations : There is no immunization history on file for this patient. Social History:  Social History     Tobacco Use    Smoking status: Current Every Day Smoker     Packs/day: 0.50    Smokeless tobacco: Never Used   Substance Use Topics    Alcohol use: Never     Frequency: Never    Drug use: Yes     Types: Marijuana     Comment: occasional     Social History     Tobacco Use   Smoking Status Current Every Day Smoker    Packs/day: 0.50   Smokeless Tobacco Never Used      Family history : no DVT no COPD       REVIEW OF SYSTEMS:    No fever / chills / sweats. No weight loss. No visual change, eye pain, eye discharge. No oral lesion, sore throat, dysphagia. Denies cough / sputum/Sob   Denies chest pain, palpitations/ dizziness  Denies nausea/ vomiting/abdominal pain/diarrhea. Denies dysuria or change in urinary function. Denies joint swelling or pain. No myalgia, arthralgia. No rashes, skin lesions   Denies focal weakness, sensory change or other neurologic symptoms  No lymph node swelling or tenderness.     BACK PAIN+   PHYSICAL EXAM:      Vitals:    BP (!) 151/100   Pulse 78   Temp 97.8 °F (36.6 °C) (Oral)   Resp 16   Ht 5' 4\" (1.626 m)   Wt 150 lb (68 kg)   SpO2 95%   BMI 25.75 kg/m²     General Appearance: alert,in SOME  acute distress, +  pallor, no icterus NEEDLE TRACKS+  Skin: warm and dry, no rash or erythema  Head: normocephalic and atraumatic  Eyes: pupils equal, round, and reactive to light, conjunctivae normal  ENT: tympanic membrane, external ear and ear canal normal bilaterally, nose without deformity, nasal mucosa and turbinates normal without polyps  Neck: supple and non-tender without mass, no thyromegaly  no cervical lymphadenopathy  Pulmonary/Chest: clear to auscultation bilaterally- no wheezes, rales or rhonchi, normal air movement, no respiratory distress  Cardiovascular: normal rate, regular rhythm, normal S1 and S2, no murmurs, rubs, clicks, or gallops, no carotid bruits  Abdomen: soft, non-tender, non-distended, normal bowel sounds, no masses or organomegaly  Extremities: no cyanosis, clubbing or edema  Musculoskeletal: normal range of motion, no joint swelling, deformity or tenderness  Neurologic: reflexes normal and symmetric, no cranial nerve deficit  Psych:  Orientation, sensorium, mood normal  Lines:  IV  Able to flex legs but has pain          DATA:    Lab Results   Component Value Date    WBC 12.5 (H) 07/01/2020    HGB 13.2 07/01/2020    HCT 40.5 07/01/2020    MCV 86.3 07/01/2020     07/01/2020     Lab Results   Component Value Date    CREATININE 0.6 07/01/2020    BUN 13 07/01/2020     07/01/2020    K 3.2 (L) 07/01/2020    CL 99 07/01/2020    CO2 23 07/01/2020       Hepatic Function Panel:   Lab Results   Component Value Date    ALKPHOS 97 07/01/2020    ALT 11 07/01/2020    AST 13 07/01/2020    PROT 7.9 07/01/2020    BILITOT <0.2 07/01/2020    LABALBU 3.5 07/01/2020     UA:No results found for: [place], GLUCOSEU, BILIRUBINUR, KETUA, SPECGRAV, BLOODU, PHUR, PROTEINU, UROBILINOGEN, NITRU, LEUKOCYTESUR, LABMICR, URINETYPE   Urine Microscopic: No results found for: LABCAST, BACTERIA, COMU, HYALCAST, WBCUA, RBCUA, EPIU      Scheduled Meds:   venlafaxine  75 mg Oral Daily    sodium chloride flush  10 mL Intravenous 2 times per day       Continuous Infusions:   sodium chloride 75 mL/hr at 07/01/20 1317       PRN Meds:  sodium chloride flush, acetaminophen **OR** acetaminophen, polyethylene glycol, ondansetron, ketorolac, HYDROmorphone, LORazepam, labetalol  Lactic acid   2.3     WBC  12.5     MICRO: cultures reviewed and updated by me     Time       Culture, Surgical [[id number]] Collected: 07/01/20 1222   Order Status: Sent Specimen: Body Fluid from Aspirate Updated: 07/01/20 1300   Culture, Blood 2 [[id number]] Collected: 07/01/20 0328   Order Status: Sent Specimen: Blood Updated: 07/01/20 0335   Culture, Blood 1 [[id number]] Collected: 07/01/20 0300   Order Status: Sent Specimen: Blood Updated: 07/01/20 0317     Esr  38       Urine Culture  No results for input(s): Robe Juan Jose in the last 72 hours. Imaging:   CT PERC ASP PARAVERT TISS/NUC PULP/INTERVERT DISC   Final Result   Successful CT guided L5-S1 disc aspiration/biopsy. CT GUIDED NEEDLE PLACEMENT   Final Result      CT LUMBAR SPINE W CONTRAST   Final Result   Findings consistent with discitis/osteomyelitis at L5-S1 with erosion of the   adjacent endplates. Soft tissue prominence in the ventral epidural space posterior to S1,   epidural phlegmon not excluded.       RECOMMENDATIONS:   MRI with and without contrast for further evaluation. XR CHEST PORTABLE   Final Result   No acute findings. MRI LUMBAR SPINE W WO CONTRAST    (Results Pending)   MRI SACRUM COCCYX W WO CONTRAST    (Results Pending)     MRI T spine and L spine  6/25    Thoracic spine  1. No findings to suggest an infectious process. 2. Cystic-like lesion in the left anterior paravertebral region from T4 to T6, posteriorly to the aorta, likely representing a bronchogenic cyst or lymphangiomatous (thoracic duct) cyst.    Lumbar spine  Discitis osteomyelitis at L5-S1 with associated anterior paravertebral inflammatory soft tissue and anterior epidural phlegmon. No definite drainable abscesses noted. No significant central canal stenosis is seen. Abnormal enhancement extends into the right neural foraminal S1, and bilateral L5-S1 neural foramen. Report Verified by: Hiral Carmona MD at 6/25/2020 9:39        All pertinent images and reports for the current Hospitalization were reviewed by me. IMPRESSION:    Patient Active Problem List   Diagnosis    Discitis     Lumbar diskitis and osteomyelitis  Back pain x 6 weeks  IVDA+  WBC elevation  Hep C+ve completed therapy   Smoking+  MRI L spine on 6/25 abnormal   Was on oral abx before admit   CT guided aspiration  7/1  Cultures may be affected         Labs, Microbiology, Radiology and pertinent results from current hospitalization and care every where were reviewed by me as a part of the consultation. PLAN :  1. Start IV Cefepime x 2 gm Q 12 HRS  2. Start IV Vancomycin x 1 gm Q 12 HRS  3. HIV screen  4. Hepatitis screen   5. ESR, CRP in AM   6. OPAT d/w pt    7. Willl need placement to complete IV abx       Discussed with patient/Family and Nursing   Risk of Complications/Morbidity: High      · Illness(es)/ Infection present that pose threat to bodily function. · There is potential for severe exacerbation of infection/side effects of treatment.   Therapy requires intensive monitoring

## 2020-07-01 NOTE — ED NOTES
Attempted to preform EKG on pt. Pt preferred that a female preformed ekg. Charge RN notified.      Eh Hoffman  07/01/20 0259

## 2020-07-02 ENCOUNTER — APPOINTMENT (OUTPATIENT)
Dept: MRI IMAGING | Age: 35
DRG: 720 | End: 2020-07-02
Payer: MEDICAID

## 2020-07-02 LAB
AMPHETAMINE SCREEN, URINE: ABNORMAL
ANION GAP SERPL CALCULATED.3IONS-SCNC: 10 MMOL/L (ref 3–16)
BARBITURATE SCREEN URINE: ABNORMAL
BASOPHILS ABSOLUTE: 0.1 K/UL (ref 0–0.2)
BASOPHILS RELATIVE PERCENT: 0.9 %
BENZODIAZEPINE SCREEN, URINE: POSITIVE
BUN BLDV-MCNC: 7 MG/DL (ref 7–20)
C-REACTIVE PROTEIN: 28.3 MG/L (ref 0–5.1)
CALCIUM SERPL-MCNC: 8.8 MG/DL (ref 8.3–10.6)
CANNABINOID SCREEN URINE: POSITIVE
CHLORIDE BLD-SCNC: 97 MMOL/L (ref 99–110)
CO2: 22 MMOL/L (ref 21–32)
COCAINE METABOLITE SCREEN URINE: POSITIVE
CREAT SERPL-MCNC: <0.5 MG/DL (ref 0.6–1.1)
EOSINOPHILS ABSOLUTE: 0.1 K/UL (ref 0–0.6)
EOSINOPHILS RELATIVE PERCENT: 0.7 %
GFR AFRICAN AMERICAN: >60
GFR NON-AFRICAN AMERICAN: >60
GLUCOSE BLD-MCNC: 115 MG/DL (ref 70–99)
HAV IGM SER IA-ACNC: ABNORMAL
HCT VFR BLD CALC: 38 % (ref 36–48)
HEMOGLOBIN: 12.5 G/DL (ref 12–16)
HEPATITIS B CORE IGM ANTIBODY: ABNORMAL
HEPATITIS B SURFACE ANTIGEN INTERPRETATION: ABNORMAL
HEPATITIS C ANTIBODY INTERPRETATION: REACTIVE
HIV AG/AB: NORMAL
HIV ANTIGEN: NORMAL
HIV-1 ANTIBODY: NORMAL
HIV-2 AB: NORMAL
LYMPHOCYTES ABSOLUTE: 1.7 K/UL (ref 1–5.1)
LYMPHOCYTES RELATIVE PERCENT: 10.5 %
Lab: ABNORMAL
MCH RBC QN AUTO: 27.9 PG (ref 26–34)
MCHC RBC AUTO-ENTMCNC: 32.8 G/DL (ref 31–36)
MCV RBC AUTO: 85 FL (ref 80–100)
METHADONE SCREEN, URINE: ABNORMAL
MONOCYTES ABSOLUTE: 1.1 K/UL (ref 0–1.3)
MONOCYTES RELATIVE PERCENT: 6.9 %
NEUTROPHILS ABSOLUTE: 12.9 K/UL (ref 1.7–7.7)
NEUTROPHILS RELATIVE PERCENT: 81 %
OPIATE SCREEN URINE: POSITIVE
OXYCODONE URINE: ABNORMAL
PDW BLD-RTO: 13 % (ref 12.4–15.4)
PH UA: 7
PHENCYCLIDINE SCREEN URINE: ABNORMAL
PLATELET # BLD: 317 K/UL (ref 135–450)
PMV BLD AUTO: 9 FL (ref 5–10.5)
POTASSIUM REFLEX MAGNESIUM: 4.2 MMOL/L (ref 3.5–5.1)
PROPOXYPHENE SCREEN: ABNORMAL
RBC # BLD: 4.47 M/UL (ref 4–5.2)
SEDIMENTATION RATE, ERYTHROCYTE: 43 MM/HR (ref 0–20)
SODIUM BLD-SCNC: 129 MMOL/L (ref 136–145)
WBC # BLD: 16 K/UL (ref 4–11)

## 2020-07-02 PROCEDURE — 85025 COMPLETE CBC W/AUTO DIFF WBC: CPT

## 2020-07-02 PROCEDURE — 87390 HIV-1 AG IA: CPT

## 2020-07-02 PROCEDURE — 2580000003 HC RX 258: Performed by: INTERNAL MEDICINE

## 2020-07-02 PROCEDURE — 85652 RBC SED RATE AUTOMATED: CPT

## 2020-07-02 PROCEDURE — 86140 C-REACTIVE PROTEIN: CPT

## 2020-07-02 PROCEDURE — 86702 HIV-2 ANTIBODY: CPT

## 2020-07-02 PROCEDURE — A9577 INJ MULTIHANCE: HCPCS | Performed by: NEUROLOGICAL SURGERY

## 2020-07-02 PROCEDURE — 6360000002 HC RX W HCPCS: Performed by: FAMILY MEDICINE

## 2020-07-02 PROCEDURE — 6360000004 HC RX CONTRAST MEDICATION: Performed by: NEUROLOGICAL SURGERY

## 2020-07-02 PROCEDURE — 80307 DRUG TEST PRSMV CHEM ANLYZR: CPT

## 2020-07-02 PROCEDURE — 94760 N-INVAS EAR/PLS OXIMETRY 1: CPT

## 2020-07-02 PROCEDURE — 72158 MRI LUMBAR SPINE W/O & W/DYE: CPT

## 2020-07-02 PROCEDURE — 6370000000 HC RX 637 (ALT 250 FOR IP): Performed by: FAMILY MEDICINE

## 2020-07-02 PROCEDURE — 1200000000 HC SEMI PRIVATE

## 2020-07-02 PROCEDURE — 80074 ACUTE HEPATITIS PANEL: CPT

## 2020-07-02 PROCEDURE — 36415 COLL VENOUS BLD VENIPUNCTURE: CPT

## 2020-07-02 PROCEDURE — 80048 BASIC METABOLIC PNL TOTAL CA: CPT

## 2020-07-02 PROCEDURE — 6360000002 HC RX W HCPCS: Performed by: INTERNAL MEDICINE

## 2020-07-02 PROCEDURE — 86701 HIV-1ANTIBODY: CPT

## 2020-07-02 PROCEDURE — 99233 SBSQ HOSP IP/OBS HIGH 50: CPT | Performed by: INTERNAL MEDICINE

## 2020-07-02 RX ORDER — NALOXONE HYDROCHLORIDE 0.4 MG/ML
0.1 INJECTION, SOLUTION INTRAMUSCULAR; INTRAVENOUS; SUBCUTANEOUS PRN
Status: DISCONTINUED | OUTPATIENT
Start: 2020-07-02 | End: 2020-07-05 | Stop reason: HOSPADM

## 2020-07-02 RX ORDER — GABAPENTIN 100 MG/1
200 CAPSULE ORAL 3 TIMES DAILY
Status: DISCONTINUED | OUTPATIENT
Start: 2020-07-02 | End: 2020-07-05 | Stop reason: HOSPADM

## 2020-07-02 RX ADMIN — GABAPENTIN 200 MG: 100 CAPSULE ORAL at 20:09

## 2020-07-02 RX ADMIN — SODIUM CHLORIDE, PRESERVATIVE FREE 10 ML: 5 INJECTION INTRAVENOUS at 08:31

## 2020-07-02 RX ADMIN — GABAPENTIN 100 MG: 100 CAPSULE ORAL at 15:18

## 2020-07-02 RX ADMIN — LORAZEPAM 1 MG: 1 TABLET ORAL at 17:35

## 2020-07-02 RX ADMIN — CEFEPIME HYDROCHLORIDE 2 G: 2 INJECTION, POWDER, FOR SOLUTION INTRAVENOUS at 05:49

## 2020-07-02 RX ADMIN — LORAZEPAM 1 MG: 1 TABLET ORAL at 08:27

## 2020-07-02 RX ADMIN — VANCOMYCIN HYDROCHLORIDE 1000 MG: 1 INJECTION, POWDER, LYOPHILIZED, FOR SOLUTION INTRAVENOUS at 20:09

## 2020-07-02 RX ADMIN — LORAZEPAM 1 MG: 1 TABLET ORAL at 12:59

## 2020-07-02 RX ADMIN — HYDROMORPHONE HYDROCHLORIDE 1 MG: 1 INJECTION, SOLUTION INTRAMUSCULAR; INTRAVENOUS; SUBCUTANEOUS at 12:59

## 2020-07-02 RX ADMIN — MORPHINE SULFATE 4 MG: 4 INJECTION, SOLUTION INTRAMUSCULAR; INTRAVENOUS at 10:51

## 2020-07-02 RX ADMIN — KETOROLAC TROMETHAMINE 15 MG: 15 INJECTION, SOLUTION INTRAMUSCULAR; INTRAVENOUS at 00:21

## 2020-07-02 RX ADMIN — HYDROMORPHONE HYDROCHLORIDE 1 MG: 1 INJECTION, SOLUTION INTRAMUSCULAR; INTRAVENOUS; SUBCUTANEOUS at 21:43

## 2020-07-02 RX ADMIN — VANCOMYCIN HYDROCHLORIDE 1000 MG: 1 INJECTION, POWDER, LYOPHILIZED, FOR SOLUTION INTRAVENOUS at 08:30

## 2020-07-02 RX ADMIN — CEFEPIME HYDROCHLORIDE 2 G: 2 INJECTION, POWDER, FOR SOLUTION INTRAVENOUS at 17:35

## 2020-07-02 RX ADMIN — MORPHINE SULFATE 4 MG: 4 INJECTION, SOLUTION INTRAMUSCULAR; INTRAVENOUS at 05:47

## 2020-07-02 RX ADMIN — GABAPENTIN 100 MG: 100 CAPSULE ORAL at 08:24

## 2020-07-02 RX ADMIN — HYDROMORPHONE HYDROCHLORIDE 1 MG: 1 INJECTION, SOLUTION INTRAMUSCULAR; INTRAVENOUS; SUBCUTANEOUS at 17:35

## 2020-07-02 RX ADMIN — HYDROMORPHONE HYDROCHLORIDE 1 MG: 1 INJECTION, SOLUTION INTRAMUSCULAR; INTRAVENOUS; SUBCUTANEOUS at 08:25

## 2020-07-02 RX ADMIN — GADOBENATE DIMEGLUMINE 12 ML: 529 INJECTION, SOLUTION INTRAVENOUS at 16:48

## 2020-07-02 RX ADMIN — HYDROMORPHONE HYDROCHLORIDE 1 MG: 1 INJECTION, SOLUTION INTRAMUSCULAR; INTRAVENOUS; SUBCUTANEOUS at 03:21

## 2020-07-02 RX ADMIN — MORPHINE SULFATE 4 MG: 4 INJECTION, SOLUTION INTRAMUSCULAR; INTRAVENOUS at 15:20

## 2020-07-02 RX ADMIN — KETOROLAC TROMETHAMINE 15 MG: 15 INJECTION, SOLUTION INTRAMUSCULAR; INTRAVENOUS at 20:08

## 2020-07-02 RX ADMIN — SODIUM CHLORIDE: 9 INJECTION, SOLUTION INTRAVENOUS at 12:59

## 2020-07-02 RX ADMIN — LORAZEPAM 1 MG: 1 TABLET ORAL at 03:21

## 2020-07-02 RX ADMIN — SODIUM CHLORIDE, PRESERVATIVE FREE 10 ML: 5 INJECTION INTRAVENOUS at 20:09

## 2020-07-02 ASSESSMENT — PAIN DESCRIPTION - LOCATION
LOCATION: BUTTOCKS
LOCATION: BACK
LOCATION: BUTTOCKS
LOCATION: BACK;BUTTOCKS
LOCATION: BACK
LOCATION: BACK

## 2020-07-02 ASSESSMENT — PAIN DESCRIPTION - FREQUENCY
FREQUENCY: CONTINUOUS

## 2020-07-02 ASSESSMENT — PAIN DESCRIPTION - PAIN TYPE
TYPE: ACUTE PAIN

## 2020-07-02 ASSESSMENT — PAIN SCALES - GENERAL
PAINLEVEL_OUTOF10: 10
PAINLEVEL_OUTOF10: 0
PAINLEVEL_OUTOF10: 10
PAINLEVEL_OUTOF10: 0
PAINLEVEL_OUTOF10: 9
PAINLEVEL_OUTOF10: 10
PAINLEVEL_OUTOF10: 10
PAINLEVEL_OUTOF10: 9

## 2020-07-02 ASSESSMENT — PAIN DESCRIPTION - DESCRIPTORS
DESCRIPTORS: SHARP;SHOOTING
DESCRIPTORS: SPASM;SHARP
DESCRIPTORS: ACHING
DESCRIPTORS: SPASM;SHARP
DESCRIPTORS: SHARP;SHOOTING
DESCRIPTORS: SPASM;SHARP
DESCRIPTORS: SHARP;SHOOTING

## 2020-07-02 ASSESSMENT — PAIN DESCRIPTION - DIRECTION
RADIATING_TOWARDS: BLE
RADIATING_TOWARDS: BUTTOCKS
RADIATING_TOWARDS: BLE
RADIATING_TOWARDS: BLE
RADIATING_TOWARDS: BUTTOCKS
RADIATING_TOWARDS: BLE
RADIATING_TOWARDS: BLE

## 2020-07-02 ASSESSMENT — PAIN DESCRIPTION - PROGRESSION
CLINICAL_PROGRESSION: NOT CHANGED

## 2020-07-02 ASSESSMENT — PAIN DESCRIPTION - ONSET
ONSET: ON-GOING
ONSET: AWAKENED FROM SLEEP
ONSET: ON-GOING
ONSET: ON-GOING
ONSET: AWAKENED FROM SLEEP
ONSET: ON-GOING

## 2020-07-02 ASSESSMENT — PAIN - FUNCTIONAL ASSESSMENT
PAIN_FUNCTIONAL_ASSESSMENT: PREVENTS OR INTERFERES SOME ACTIVE ACTIVITIES AND ADLS
PAIN_FUNCTIONAL_ASSESSMENT: ACTIVITIES ARE NOT PREVENTED
PAIN_FUNCTIONAL_ASSESSMENT: PREVENTS OR INTERFERES SOME ACTIVE ACTIVITIES AND ADLS
PAIN_FUNCTIONAL_ASSESSMENT: ACTIVITIES ARE NOT PREVENTED
PAIN_FUNCTIONAL_ASSESSMENT: PREVENTS OR INTERFERES SOME ACTIVE ACTIVITIES AND ADLS
PAIN_FUNCTIONAL_ASSESSMENT: ACTIVITIES ARE NOT PREVENTED
PAIN_FUNCTIONAL_ASSESSMENT: ACTIVITIES ARE NOT PREVENTED

## 2020-07-02 ASSESSMENT — PAIN DESCRIPTION - ORIENTATION
ORIENTATION: RIGHT;LEFT
ORIENTATION: LOWER
ORIENTATION: RIGHT;LEFT
ORIENTATION: RIGHT;LEFT

## 2020-07-02 NOTE — SIGNIFICANT EVENT
Nursing updated NP regarding patient this evening. Her boyfriend brought her bags to our facility and told the , Elvi Rye that after she gets this Simon Kidd will probably kill herself. \" THis was relayed to the nurse during a rapid response. I was notified immediately after the rapid response and immediately presented to her room where she was not found. Her call light was on but no one was in the room. A code dinorah was called. She was found walking towards the ER vending machines. Security came to the bedside. She was found with paraphernalia tonight as well as a knives and lighter fluid, fireworks, etc. I had security take these out of the room for safety of staff and patient. She repeatedly denies having any suicidal/homicidal ideations. She states that \"I don't know why he would say that. \"  She then stated that she wanted to leave and I told her if would have to be against medical advice. She again denied having suicidal ideations. I placed a telesitter at bedside and nurse was updated. Patient was updated and while she was upset I feel at this time it was best for the safety of everyone. They can re-evaluate her having her things back in the morning if the primary MD agrees.     ONEIL Pack CNP 7/1/2020 11:48 PM

## 2020-07-02 NOTE — PROGRESS NOTES
Hospitalist Progress Note      PCP: No primary care provider on file. Date of Admission: 7/1/2020    Chief Complaint: Back pain    Hospital Course:      Subjective: Back pain stable. Waiting for MRI. No new issues      Medications:  Reviewed    Infusion Medications    sodium chloride 75 mL/hr at 07/02/20 1259     Scheduled Medications    venlafaxine  75 mg Oral Daily    sodium chloride flush  10 mL Intravenous 2 times per day    gabapentin  100 mg Oral TID    cefepime  2 g Intravenous Q12H    vancomycin  1,000 mg Intravenous Q12H     PRN Meds: sodium chloride flush, polyethylene glycol, ondansetron, HYDROmorphone, LORazepam, labetalol, ketorolac, morphine, metaxalone      Intake/Output Summary (Last 24 hours) at 7/2/2020 1747  Last data filed at 7/2/2020 1521  Gross per 24 hour   Intake 2814.67 ml   Output 1600 ml   Net 1214.67 ml       Exam:    BP (!) 158/92   Pulse 96   Temp 98.7 °F (37.1 °C) (Oral)   Resp 16   Ht 5' 4\" (1.626 m)   Wt 147 lb 11.3 oz (67 kg)   SpO2 94%   BMI 25.35 kg/m²     General appearance: No apparent distress, appears stated age and cooperative. HEENT: Pupils equal, round, and reactive to light. Conjunctivae/corneas clear. Neck: Supple, with full range of motion. No jugular venous distention. Trachea midline. Respiratory:  Normal respiratory effort. Clear to auscultation, bilaterally without Rales/Wheezes/Rhonchi. Cardiovascular: Regular rate and rhythm with normal S1/S2 without murmurs, rubs or gallops. Abdomen: Soft, non-tender, non-distended with normal bowel sounds. Musculoskeletal: No clubbing, cyanosis or edema bilaterally. Full range of motion without deformity. Skin: Skin color, texture, turgor normal.  No rashes or lesions. Neurologic:  Neurovascularly intact without any focal sensory/motor deficits.  Cranial nerves: II-XII intact, grossly non-focal.  Lower back tenderness over the spine and paraspinal muscles  Psychiatric: Alert and oriented, thought content appropriate, normal insight  Capillary Refill: Brisk,< 3 seconds   Peripheral Pulses: +2 palpable, equal bilaterally       Labs:   Recent Labs     07/01/20 0300 07/02/20  0827   WBC 12.5* 16.0*   HGB 13.2 12.5   HCT 40.5 38.0    317     Recent Labs     07/01/20  0300 07/02/20  0827    129*   K 3.2* 4.2   CL 99 97*   CO2 23 22   BUN 13 7   CREATININE 0.6 <0.5*   CALCIUM 8.8 8.8     Recent Labs     07/01/20  0300   AST 13*   ALT 11   BILITOT <0.2   ALKPHOS 97     No results for input(s): INR in the last 72 hours. No results for input(s): Burnadette Lundborg in the last 72 hours. Urinalysis:    No results found for: Елена Hoops, BACTERIA, RBCUA, BLOODU, SPECGRAV, Barbara [place] 994    Radiology:  CT PERC ASP PARAVERT TISS/NUC PULP/INTERVERT DISC   Final Result   Successful CT guided L5-S1 disc aspiration/biopsy. CT GUIDED NEEDLE PLACEMENT   Final Result      CT LUMBAR SPINE W CONTRAST   Final Result   Findings consistent with discitis/osteomyelitis at L5-S1 with erosion of the   adjacent endplates. Soft tissue prominence in the ventral epidural space posterior to S1,   epidural phlegmon not excluded. RECOMMENDATIONS:   MRI with and without contrast for further evaluation. XR CHEST PORTABLE   Final Result   No acute findings. MRI LUMBAR SPINE W WO CONTRAST    (Results Pending)           Assessment/Plan:    Active Hospital Problems    Diagnosis Date Noted    Discitis [M46.40] 07/01/2020       L5-S1 discitis and osteomyelitis:  -Spinal disc culture growing Enterobacter. Sensitivities pending  -Holding antibiotic therapy until sample obtained. ID consulted to guide antibiotic recommendations  -Blood cultures ordered.   If return positive will need TTE to rule out endocarditis  -Does not currently have neurologic symptoms of cord compression at this time.  -Neuro surgery consulted for further evaluation              -MRI pending     IV drug use history:  -Patient threatened to leave AMA this morning before obtaining culture sample. Low-dose Dilaudid ordered. Low-dose Ativan also ordered for anxiety and potential withdrawal from polysubstance abuse  -Wean controlled substance medications on a daily basis     Lactic acidosis: Resolved  -Likely due to infection  -Resolved with fluids.   Continue to monitor    DVT Prophylaxis: Lovenox  Diet: DIET GENERAL;  Code Status: Full Code    PT/OT Eval Status: Not applicable    Dispo -Camila Hwang MD

## 2020-07-02 NOTE — PROGRESS NOTES
Pt A&Ox4, c/o pain #10/10, pain medication effective as ordered. Pt able to receive rest post administration of pain medication. Will continue to monitor. MRI checklist complete. Pt resting in bed, bed in lowest position, call light in reach, bed alarm on, bedside table in reach.   Electronically signed by Samantha Gonsales RN on 7/2/2020 at 1:38 PM

## 2020-07-02 NOTE — PLAN OF CARE
RN will notify MRI when safety checklist completed. MRI department was told yesterday MRI on hold until patient seen by Neuro and RN is looking into this as well.

## 2020-07-02 NOTE — PROGRESS NOTES
Syringe found in patient's shirt pocket. Patient denied drug use in hospital and denied any drugs on person. Syringe locked in safe. Nursing supervisor and security notified to search patient belongings. NP also notified.

## 2020-07-02 NOTE — CONSULTS
Romberg negative, and gait is normal.  DATA:  CBC:   Lab Results   Component Value Date    WBC 16.0 07/02/2020    RBC 4.47 07/02/2020    HGB 12.5 07/02/2020    HCT 38.0 07/02/2020    MCV 85.0 07/02/2020    MCH 27.9 07/02/2020    MCHC 32.8 07/02/2020    RDW 13.0 07/02/2020     07/02/2020    MPV 9.0 07/02/2020     CBC with Differential:    Lab Results   Component Value Date    WBC 16.0 07/02/2020    RBC 4.47 07/02/2020    HGB 12.5 07/02/2020    HCT 38.0 07/02/2020     07/02/2020    MCV 85.0 07/02/2020    MCH 27.9 07/02/2020    MCHC 32.8 07/02/2020    RDW 13.0 07/02/2020    LYMPHOPCT 10.5 07/02/2020    MONOPCT 6.9 07/02/2020    BASOPCT 0.9 07/02/2020    MONOSABS 1.1 07/02/2020    LYMPHSABS 1.7 07/02/2020    EOSABS 0.1 07/02/2020    BASOSABS 0.1 07/02/2020     WBC:    Lab Results   Component Value Date    WBC 16.0 07/02/2020     CT Lumbar spine demonstrated findings consistent with osteomyelitis/discitis at L5-S1      IMPRESSION/RECOMMENDATIONS:      29yo female with history of IVDA with heroin with findings consistent with discitis/osteomyelitis at L5-S1    Successful CT guided aspiration yesterday, awaiting final results. Awaiting MRI lumbar spine w/w/o for further evaluation. ID on board, continue ID directed abx treatment. Continue other medical treatment and pain control. Further recommendations to come pending MRI.

## 2020-07-02 NOTE — PROGRESS NOTES
Pt changed in to gown for MRI, suspicious substance fell from clothing while changing. Security notified and substance handed to security. Once returning from MRI, security searched room with pt in room and this nurse in room. Items of concern taken by security and noted. Shirt, pants, undergarments left in closet in room.   Electronically signed by Araceli Jordan RN on 7/2/2020 at 6:57 PM

## 2020-07-02 NOTE — PROGRESS NOTES
Infectious Disease Follow up Notes  Admit Date: 2020  Hospital Day: 2    Antibiotics : IV Vancomycin  IV Cefepime     CHIEF COMPLAINT:     Back pain  Lumbar diskitis  IVDA  Hep C+V    Subjective interval History :  28 y.o. Woman with IVDA uses Heroin last use before admit was seen at [facility] for BACK pain and MRI L spine done on  abnormal with diskitis and left AMA and now presented here for admission and s/p IR guided aspiration CT L spine here abnormal with L5 and S1 diskitis and WBC elevation noted she was d/c to home on oral CIPRO and Rifampin per  notes care every where.       CT guided biopsy results cx pending c/o on back pain tolerating IV abx ok and Blood cx in process just came back from MRI  L spine     Past Medical History:    Past Medical History:   Diagnosis Date    Herniated lumbar intervertebral disc        Past Surgical History:    Past Surgical History:   Procedure Laterality Date    CARPAL TUNNEL RELEASE Bilateral      SECTION      CHOLECYSTECTOMY         Current Medications:    No outpatient medications have been marked as taking for the 20 encounter [facility] Encounter). Allergies:  Fluconazole and Levofloxacin    Immunizations : There is no immunization history on file for this patient. Social History:    Social History     Tobacco Use    Smoking status: Current Every Day Smoker     Packs/day: 0.50    Smokeless tobacco: Never Used   Substance Use Topics    Alcohol use: Never     Frequency: Never    Drug use: Yes     Types: Marijuana     Comment: occasional     Social History     Tobacco Use   Smoking Status Current Every Day Smoker    Packs/day: 0.50   Smokeless Tobacco Never Used      Family HISTORY : NO dvt NO copd       REVIEW OF SYSTEMS:    No fever / chills / sweats. No weight loss. No visual change, eye pain, eye discharge.     No oral lesion, sore throat,  (L) 07/02/2020    K 4.2 07/02/2020    CL 97 (L) 07/02/2020    CO2 22 07/02/2020       Hepatic Function Panel:   Lab Results   Component Value Date    ALKPHOS 97 07/01/2020    ALT 11 07/01/2020    AST 13 07/01/2020    PROT 7.9 07/01/2020    BILITOT <0.2 07/01/2020    LABALBU 3.5 07/01/2020       UA:  Lab Results   Component Value Date    PHUR 7.0 07/02/2020      Urine Microscopic: No results found for: LABCAST, BACTERIA, COMU, HYALCAST, WBCUA, RBCUA, EPIU  Lactic acid   2.3      WBC  12.5      MICRO: cultures reviewed and updated by me            Culture, Surgical [[id number]] Collected: 07/01/20 1222   Order Status: Completed Specimen: Body Fluid from Back Updated: 07/02/20 1006    Anaerobic Culture Anaerobic culture further report to follow   Narrative:     ORDER#: [id number]                          ORDERED BY: Karen Singh  SOURCE: Back Lower                         COLLECTED:  07/01/20 12:22  ANTIBIOTICS AT [facility] :                      RECEIVED :  07/01/20 13:00  Performed at:  [facility]  [address] [place], [place] [address]   Phone [phone]   Culture, Blood 2 [[id number]] Collected: 07/01/20 0328   Order Status: Completed Specimen: Blood Updated: 07/02/20 0715    Culture, Blood 2 No Growth to date.  Any change in status will be called. Narrative:     ORDER#: [id number]                          ORDERED BY: Mike Postal  SOURCE: Blood                              COLLECTED:  07/01/20 03:28  ANTIBIOTICS AT [facility] :                      RECEIVED :  07/01/20 03:34  If child <=2 yrs old please draw pediatric bottle. ~Blood Culture #2  Performed at:  [facility]  [address] [place] [address]   Phone [phone]   Culture, Blood 1 [[id number]] Collected: 07/01/20 0300   Order Status: Completed Specimen: Blood Updated: 07/02/20 0715    Blood Culture, Routine No Growth to date.  Any change in status will be called. Narrative:     ORDER#: [id number]                          ORDERED BY: Emiliano Esteves  SOURCE: Blood                              COLLECTED:  07/01/20 03:00  ANTIBIOTICS AT [facility] :                      RECEIVED :  07/01/20 03:16  If child <=2 yrs old please draw pediatric bottle. ~Blood Culture #1  [address]:  [address]   Phone [phone]         IMAGING:    CT PERC ASP PARAVERT TISS/NUC PULP/INTERVERT DISC   Final Result   Successful CT guided L5-S1 disc aspiration/biopsy. CT GUIDED NEEDLE PLACEMENT   Final Result      CT LUMBAR SPINE W CONTRAST   Final Result   Findings consistent with discitis/osteomyelitis at L5-S1 with erosion of the   adjacent endplates. Soft tissue prominence in the ventral epidural space posterior to S1,   epidural phlegmon not excluded. RECOMMENDATIONS:   MRI with and without contrast for further evaluation. XR CHEST PORTABLE   Final Result   No acute findings. MRI LUMBAR SPINE W WO CONTRAST    (Results Pending)         All the pertinent images and reports for the current Hospitalization were reviewed by me     Scheduled Meds:   venlafaxine  75 mg Oral Daily    sodium chloride flush  10 mL Intravenous 2 times per day    gabapentin  100 mg Oral TID    cefepime  2 g Intravenous Q12H    vancomycin  1,000 mg Intravenous Q12H       Continuous Infusions:   sodium chloride 75 mL/hr at 07/02/20 1259       PRN Meds:  sodium chloride flush, polyethylene glycol, ondansetron, HYDROmorphone, LORazepam, labetalol, ketorolac, morphine, metaxalone    MRI T spine and L spine  6/25     Thoracic spine  1. No findings to suggest an infectious process.   2. Cystic-like lesion in the left anterior paravertebral region from T4 to T6, posteriorly to the aorta, likely representing a bronchogenic cyst or lymphangiomatous (thoracic duct) cyst.    Lumbar spine  Discitis osteomyelitis at L5-S1

## 2020-07-02 NOTE — CARE COORDINATION
INITIAL CASE MANAGEMENT ASSESSMENT    Reviewed chart, met with patient to assess possible discharge needs. Explained Case Management role/services. Living Situation: Patient lives in [place]. ADLs:      DME:     PT/OT Recs:      Active Services:     Transportation:      Medications:     PCP:       HD/PD: N/A    PLAN/COMMENTS:     SW/CM provided contact information for patient or family to call with any questions. SW/CM will follow and assist as needed.

## 2020-07-02 NOTE — PLAN OF CARE
Problem: Pain:  Goal: Pain level will decrease  Description: Pain level will decrease  Outcome: Ongoing  Goal: Control of acute pain  Description: Control of acute pain  Outcome: Ongoing  Goal: Control of chronic pain  Description: Control of chronic pain  Outcome: Ongoing     Problem: Falls - Risk of:  Goal: Will remain free from falls  Description: Will remain free from falls  Outcome: Ongoing  Goal: Absence of physical injury  Description: Absence of physical injury  Outcome: Ongoing     Problem: Substance Abuse:  Goal: Absence of drug withdrawal signs and symptoms  Description: Absence of drug withdrawal signs and symptoms  Outcome: Ongoing

## 2020-07-03 LAB
ANION GAP SERPL CALCULATED.3IONS-SCNC: 12 MMOL/L (ref 3–16)
BASOPHILS ABSOLUTE: 0.1 K/UL (ref 0–0.2)
BASOPHILS RELATIVE PERCENT: 0.8 %
BUN BLDV-MCNC: 7 MG/DL (ref 7–20)
CALCIUM SERPL-MCNC: 8.5 MG/DL (ref 8.3–10.6)
CHLORIDE BLD-SCNC: 96 MMOL/L (ref 99–110)
CO2: 21 MMOL/L (ref 21–32)
CREAT SERPL-MCNC: <0.5 MG/DL (ref 0.6–1.1)
EOSINOPHILS ABSOLUTE: 0 K/UL (ref 0–0.6)
EOSINOPHILS RELATIVE PERCENT: 0.2 %
GFR AFRICAN AMERICAN: >60
GFR NON-AFRICAN AMERICAN: >60
GLUCOSE BLD-MCNC: 109 MG/DL (ref 70–99)
HCT VFR BLD CALC: 36.4 % (ref 36–48)
HEMOGLOBIN: 11.9 G/DL (ref 12–16)
LYMPHOCYTES ABSOLUTE: 2 K/UL (ref 1–5.1)
LYMPHOCYTES RELATIVE PERCENT: 12.2 %
MCH RBC QN AUTO: 27.4 PG (ref 26–34)
MCHC RBC AUTO-ENTMCNC: 32.8 G/DL (ref 31–36)
MCV RBC AUTO: 83.5 FL (ref 80–100)
MONOCYTES ABSOLUTE: 1.5 K/UL (ref 0–1.3)
MONOCYTES RELATIVE PERCENT: 9.3 %
NEUTROPHILS ABSOLUTE: 12.4 K/UL (ref 1.7–7.7)
NEUTROPHILS RELATIVE PERCENT: 77.5 %
PDW BLD-RTO: 12.8 % (ref 12.4–15.4)
PLATELET # BLD: 281 K/UL (ref 135–450)
PMV BLD AUTO: 8.4 FL (ref 5–10.5)
POTASSIUM REFLEX MAGNESIUM: 3.7 MMOL/L (ref 3.5–5.1)
RBC # BLD: 4.36 M/UL (ref 4–5.2)
SODIUM BLD-SCNC: 129 MMOL/L (ref 136–145)
WBC # BLD: 16.1 K/UL (ref 4–11)

## 2020-07-03 PROCEDURE — 6370000000 HC RX 637 (ALT 250 FOR IP): Performed by: FAMILY MEDICINE

## 2020-07-03 PROCEDURE — 94760 N-INVAS EAR/PLS OXIMETRY 1: CPT

## 2020-07-03 PROCEDURE — 6360000002 HC RX W HCPCS: Performed by: FAMILY MEDICINE

## 2020-07-03 PROCEDURE — 6360000002 HC RX W HCPCS: Performed by: INTERNAL MEDICINE

## 2020-07-03 PROCEDURE — 1200000000 HC SEMI PRIVATE

## 2020-07-03 PROCEDURE — 85025 COMPLETE CBC W/AUTO DIFF WBC: CPT

## 2020-07-03 PROCEDURE — 2580000003 HC RX 258: Performed by: INTERNAL MEDICINE

## 2020-07-03 PROCEDURE — 80048 BASIC METABOLIC PNL TOTAL CA: CPT

## 2020-07-03 RX ADMIN — SODIUM CHLORIDE: 9 INJECTION, SOLUTION INTRAVENOUS at 20:48

## 2020-07-03 RX ADMIN — LORAZEPAM 1 MG: 1 TABLET ORAL at 08:53

## 2020-07-03 RX ADMIN — HYDROMORPHONE HYDROCHLORIDE 1 MG: 1 INJECTION, SOLUTION INTRAMUSCULAR; INTRAVENOUS; SUBCUTANEOUS at 14:43

## 2020-07-03 RX ADMIN — METAXALONE 800 MG: 800 TABLET ORAL at 13:11

## 2020-07-03 RX ADMIN — GABAPENTIN 200 MG: 100 CAPSULE ORAL at 14:22

## 2020-07-03 RX ADMIN — LORAZEPAM 1 MG: 1 TABLET ORAL at 20:47

## 2020-07-03 RX ADMIN — GABAPENTIN 200 MG: 100 CAPSULE ORAL at 20:47

## 2020-07-03 RX ADMIN — LORAZEPAM 1 MG: 1 TABLET ORAL at 14:28

## 2020-07-03 RX ADMIN — GABAPENTIN 200 MG: 100 CAPSULE ORAL at 08:53

## 2020-07-03 RX ADMIN — HYDROMORPHONE HYDROCHLORIDE 1 MG: 1 INJECTION, SOLUTION INTRAMUSCULAR; INTRAVENOUS; SUBCUTANEOUS at 21:26

## 2020-07-03 RX ADMIN — CEFEPIME HYDROCHLORIDE 2 G: 2 INJECTION, POWDER, FOR SOLUTION INTRAVENOUS at 18:06

## 2020-07-03 RX ADMIN — SODIUM CHLORIDE, PRESERVATIVE FREE 10 ML: 5 INJECTION INTRAVENOUS at 08:53

## 2020-07-03 RX ADMIN — KETOROLAC TROMETHAMINE 15 MG: 15 INJECTION, SOLUTION INTRAMUSCULAR; INTRAVENOUS at 20:48

## 2020-07-03 RX ADMIN — CEFEPIME HYDROCHLORIDE 2 G: 2 INJECTION, POWDER, FOR SOLUTION INTRAVENOUS at 05:56

## 2020-07-03 RX ADMIN — HYDROMORPHONE HYDROCHLORIDE 1 MG: 1 INJECTION, SOLUTION INTRAMUSCULAR; INTRAVENOUS; SUBCUTANEOUS at 10:32

## 2020-07-03 RX ADMIN — HYDROMORPHONE HYDROCHLORIDE 1 MG: 1 INJECTION, SOLUTION INTRAMUSCULAR; INTRAVENOUS; SUBCUTANEOUS at 18:10

## 2020-07-03 RX ADMIN — SODIUM CHLORIDE: 9 INJECTION, SOLUTION INTRAVENOUS at 08:53

## 2020-07-03 RX ADMIN — LORAZEPAM 1 MG: 1 TABLET ORAL at 01:52

## 2020-07-03 RX ADMIN — HYDROMORPHONE HYDROCHLORIDE 1 MG: 1 INJECTION, SOLUTION INTRAMUSCULAR; INTRAVENOUS; SUBCUTANEOUS at 01:48

## 2020-07-03 RX ADMIN — HYDROMORPHONE HYDROCHLORIDE 1 MG: 1 INJECTION, SOLUTION INTRAMUSCULAR; INTRAVENOUS; SUBCUTANEOUS at 05:56

## 2020-07-03 RX ADMIN — MORPHINE SULFATE 4 MG: 4 INJECTION, SOLUTION INTRAMUSCULAR; INTRAVENOUS at 12:40

## 2020-07-03 RX ADMIN — KETOROLAC TROMETHAMINE 15 MG: 15 INJECTION, SOLUTION INTRAMUSCULAR; INTRAVENOUS at 04:10

## 2020-07-03 ASSESSMENT — PAIN DESCRIPTION - PROGRESSION
CLINICAL_PROGRESSION: NOT CHANGED

## 2020-07-03 ASSESSMENT — PAIN DESCRIPTION - LOCATION
LOCATION: BUTTOCKS
LOCATION: BUTTOCKS
LOCATION: BACK
LOCATION: BACK
LOCATION: BUTTOCKS

## 2020-07-03 ASSESSMENT — PAIN DESCRIPTION - ONSET
ONSET: ON-GOING

## 2020-07-03 ASSESSMENT — PAIN DESCRIPTION - DESCRIPTORS
DESCRIPTORS: ACHING
DESCRIPTORS: ACHING
DESCRIPTORS: SHARP;SHOOTING

## 2020-07-03 ASSESSMENT — PAIN SCALES - GENERAL
PAINLEVEL_OUTOF10: 9
PAINLEVEL_OUTOF10: 0
PAINLEVEL_OUTOF10: 9
PAINLEVEL_OUTOF10: 9
PAINLEVEL_OUTOF10: 0
PAINLEVEL_OUTOF10: 10
PAINLEVEL_OUTOF10: 10
PAINLEVEL_OUTOF10: 9
PAINLEVEL_OUTOF10: 9
PAINLEVEL_OUTOF10: 10
PAINLEVEL_OUTOF10: 9
PAINLEVEL_OUTOF10: 7

## 2020-07-03 ASSESSMENT — PAIN DESCRIPTION - DIRECTION
RADIATING_TOWARDS: BACK/BLE
RADIATING_TOWARDS: BLE
RADIATING_TOWARDS: BACK

## 2020-07-03 ASSESSMENT — PAIN DESCRIPTION - FREQUENCY
FREQUENCY: INTERMITTENT
FREQUENCY: CONTINUOUS
FREQUENCY: CONTINUOUS
FREQUENCY: INTERMITTENT
FREQUENCY: CONTINUOUS

## 2020-07-03 ASSESSMENT — PAIN DESCRIPTION - ORIENTATION
ORIENTATION: RIGHT;LEFT
ORIENTATION: LOWER
ORIENTATION: RIGHT;LEFT
ORIENTATION: LOWER
ORIENTATION: RIGHT;LEFT

## 2020-07-03 ASSESSMENT — PAIN DESCRIPTION - PAIN TYPE
TYPE: ACUTE PAIN

## 2020-07-03 NOTE — PLAN OF CARE
Problem: Pain:  Goal: Control of acute pain  Description: Control of acute pain  7/3/2020 0538 by Marcos Melton RN  Note: D: pt.  With complaints of pain to her lower back that she rates at a level 9-10 on the pain scale  A: medicated with Dilaudid 1mg IV @ 2143, 0148 and Toradol 15mg IV @2008 and 0410  R: resting quietly afterwards

## 2020-07-03 NOTE — PROGRESS NOTES
Hospitalist Progress Note      PCP: No primary care provider on file. Date of Admission: 7/1/2020    Chief Complaint: Back pain    Hospital Course: Ms. Ezequiel Holguin was admitted and obtained IR guided lumbosacral disc sampling for culture prior to initiation of empiric antibiotics. She was continued on vancomycin and cefepime until Enterobacter species sensitivities returned. ID was involved in the management of antibiotic therapy. Subjective: Back pain stable. MRI results show phlegmon with potential abscess versus herniated disc. Waiting for neurosurgery recommendations. Medications:  Reviewed    Infusion Medications    sodium chloride 75 mL/hr at 07/02/20 1259     Scheduled Medications    gabapentin  200 mg Oral TID    venlafaxine  75 mg Oral Daily    sodium chloride flush  10 mL Intravenous 2 times per day    cefepime  2 g Intravenous Q12H     PRN Meds: naloxone, sodium chloride flush, polyethylene glycol, ondansetron, HYDROmorphone, LORazepam, labetalol, ketorolac, morphine, metaxalone      Intake/Output Summary (Last 24 hours) at 7/3/2020 0836  Last data filed at 7/3/2020 0556  Gross per 24 hour   Intake 1612.17 ml   Output 1550 ml   Net 62.17 ml       Exam:    BP (!) 146/89   Pulse 78   Temp 99.3 °F (37.4 °C) (Oral)   Resp 16   Ht 5' 4\" (1.626 m)   Wt 142 lb 3.2 oz (64.5 kg)   SpO2 95%   BMI 24.41 kg/m²     General appearance: No apparent distress, appears stated age and cooperative. HEENT: Pupils equal, round, and reactive to light. Conjunctivae/corneas clear. Neck: Supple, with full range of motion. No jugular venous distention. Trachea midline. Respiratory:  Normal respiratory effort. Clear to auscultation, bilaterally without Rales/Wheezes/Rhonchi. Cardiovascular: Regular rate and rhythm with normal S1/S2 without murmurs, rubs or gallops. Abdomen: Soft, non-tender, non-distended with normal bowel sounds. Musculoskeletal: No clubbing, cyanosis or edema bilaterally.   Full range of motion without deformity. Skin: Skin color, texture, turgor normal.  No rashes or lesions. Neurologic:  Neurovascularly intact without any focal sensory/motor deficits. Cranial nerves: II-XII intact, grossly non-focal.  Lower back tenderness over the spine and paraspinal muscles  Psychiatric: Alert and oriented, thought content appropriate, normal insight  Capillary Refill: Brisk,< 3 seconds   Peripheral Pulses: +2 palpable, equal bilaterally       Labs:   Recent Labs     07/01/20  0300 07/02/20  0827 07/03/20  0751   WBC 12.5* 16.0* 16.1*   HGB 13.2 12.5 11.9*   HCT 40.5 38.0 36.4    317 281     Recent Labs     07/01/20  0300 07/02/20  0827 07/03/20  0751    129* 129*   K 3.2* 4.2 3.7   CL 99 97* 96*   CO2 23 22 21   BUN 13 7 7   CREATININE 0.6 <0.5* <0.5*   CALCIUM 8.8 8.8 8.5     Recent Labs     07/01/20  0300   AST 13*   ALT 11   BILITOT <0.2   ALKPHOS 97     No results for input(s): INR in the last 72 hours. No results for input(s): Shayna Formosa in the last 72 hours. Urinalysis:    No results found for: Marlyce Scarce, BACTERIA, RBCUA, BLOODU, SPECGRAV, Barbara [place] 994    Radiology:  MRI LUMBAR SPINE W WO CONTRAST   Final Result   Discitis osteomyelitis at L5-S1. Phlegmon extends into the prevertebral soft   tissues along the bilateral L5-S1 foramina and into the S1 foramina as well   along the course of the S1 nerve roots. Loculated collection identified at L5-S1 may represent a extruded disc versus   loculated epidural abscess. This measures up to 1.7 cm greatest dimension. .      Otherwise mild-to-moderate changes lower spine. CT PERC ASP PARAVERT TISS/NUC PULP/INTERVERT DISC   Final Result   Successful CT guided L5-S1 disc aspiration/biopsy. CT GUIDED NEEDLE PLACEMENT   Final Result      CT LUMBAR SPINE W CONTRAST   Final Result   Findings consistent with discitis/osteomyelitis at L5-S1 with erosion of the   adjacent endplates.       Soft tissue prominence in the ventral epidural space posterior to S1,   epidural phlegmon not excluded. RECOMMENDATIONS:   MRI with and without contrast for further evaluation. XR CHEST PORTABLE   Final Result   No acute findings. Assessment/Plan:    Active Hospital Problems    Diagnosis Date Noted    Discitis [M46.40] 07/01/2020       L5-S1 discitis and osteomyelitis:  -Spinal disc culture growing Enterobacter. Sensitivities pending  -Holding antibiotic therapy until sample obtained. ID consulted to guide antibiotic recommendations  -Blood cultures ordered. If return positive will need TTE to rule out endocarditis  -Does not currently have neurologic symptoms of cord compression at this time.  -Neuro surgery consulted for further evaluation              -MRI shows phlegmon and disc herniation versus abscess. Plan from neurosurgery pending     IV drug use history:  -Patient threatened to leave AMA. Low-dose Dilaudid ordered. Low-dose Ativan also ordered for anxiety and potential withdrawal from polysubstance abuse  -Wean controlled substance medications on a daily basis as tolerated     Lactic acidosis: Resolved  -Likely due to infection  -Resolved with fluids.   Continue to monitor    DVT Prophylaxis: Lovenox  Diet: DIET GENERAL;  Code Status: Full Code    PT/OT Eval Status: Not applicable    Dispo -Chet Morris MD

## 2020-07-04 VITALS
OXYGEN SATURATION: 97 % | BODY MASS INDEX: 24.46 KG/M2 | HEART RATE: 96 BPM | TEMPERATURE: 100.9 F | HEIGHT: 64 IN | SYSTOLIC BLOOD PRESSURE: 145 MMHG | WEIGHT: 143.3 LBS | DIASTOLIC BLOOD PRESSURE: 104 MMHG | RESPIRATION RATE: 20 BRPM

## 2020-07-04 LAB
ANION GAP SERPL CALCULATED.3IONS-SCNC: 11 MMOL/L (ref 3–16)
BASOPHILS ABSOLUTE: 0.2 K/UL (ref 0–0.2)
BASOPHILS RELATIVE PERCENT: 1.5 %
BUN BLDV-MCNC: 11 MG/DL (ref 7–20)
CALCIUM SERPL-MCNC: 8.4 MG/DL (ref 8.3–10.6)
CHLORIDE BLD-SCNC: 100 MMOL/L (ref 99–110)
CO2: 23 MMOL/L (ref 21–32)
CREAT SERPL-MCNC: <0.5 MG/DL (ref 0.6–1.1)
EOSINOPHILS ABSOLUTE: 0.1 K/UL (ref 0–0.6)
EOSINOPHILS RELATIVE PERCENT: 1 %
GFR AFRICAN AMERICAN: >60
GFR NON-AFRICAN AMERICAN: >60
GLUCOSE BLD-MCNC: 90 MG/DL (ref 70–99)
HCT VFR BLD CALC: 37.6 % (ref 36–48)
HEMOGLOBIN: 12.4 G/DL (ref 12–16)
LYMPHOCYTES ABSOLUTE: 2.1 K/UL (ref 1–5.1)
LYMPHOCYTES RELATIVE PERCENT: 14.9 %
MCH RBC QN AUTO: 27.7 PG (ref 26–34)
MCHC RBC AUTO-ENTMCNC: 32.9 G/DL (ref 31–36)
MCV RBC AUTO: 84.3 FL (ref 80–100)
MONOCYTES ABSOLUTE: 1.3 K/UL (ref 0–1.3)
MONOCYTES RELATIVE PERCENT: 9 %
NEUTROPHILS ABSOLUTE: 10.4 K/UL (ref 1.7–7.7)
NEUTROPHILS RELATIVE PERCENT: 73.6 %
PDW BLD-RTO: 12.9 % (ref 12.4–15.4)
PLATELET # BLD: 267 K/UL (ref 135–450)
PMV BLD AUTO: 8.7 FL (ref 5–10.5)
POTASSIUM REFLEX MAGNESIUM: 3.9 MMOL/L (ref 3.5–5.1)
RBC # BLD: 4.46 M/UL (ref 4–5.2)
SODIUM BLD-SCNC: 134 MMOL/L (ref 136–145)
WBC # BLD: 14.1 K/UL (ref 4–11)

## 2020-07-04 PROCEDURE — 2580000003 HC RX 258: Performed by: INTERNAL MEDICINE

## 2020-07-04 PROCEDURE — 2500000003 HC RX 250 WO HCPCS: Performed by: FAMILY MEDICINE

## 2020-07-04 PROCEDURE — 6370000000 HC RX 637 (ALT 250 FOR IP): Performed by: FAMILY MEDICINE

## 2020-07-04 PROCEDURE — 1200000000 HC SEMI PRIVATE

## 2020-07-04 PROCEDURE — 80048 BASIC METABOLIC PNL TOTAL CA: CPT

## 2020-07-04 PROCEDURE — 85025 COMPLETE CBC W/AUTO DIFF WBC: CPT

## 2020-07-04 PROCEDURE — 36415 COLL VENOUS BLD VENIPUNCTURE: CPT

## 2020-07-04 PROCEDURE — 6360000002 HC RX W HCPCS: Performed by: FAMILY MEDICINE

## 2020-07-04 PROCEDURE — 94760 N-INVAS EAR/PLS OXIMETRY 1: CPT

## 2020-07-04 PROCEDURE — 6360000002 HC RX W HCPCS: Performed by: INTERNAL MEDICINE

## 2020-07-04 RX ORDER — SENNA PLUS 8.6 MG/1
1 TABLET ORAL 2 TIMES DAILY
Status: DISCONTINUED | OUTPATIENT
Start: 2020-07-04 | End: 2020-07-05 | Stop reason: HOSPADM

## 2020-07-04 RX ORDER — ALPRAZOLAM 0.5 MG/1
1 TABLET ORAL 2 TIMES DAILY PRN
Status: DISCONTINUED | OUTPATIENT
Start: 2020-07-04 | End: 2020-07-04

## 2020-07-04 RX ORDER — ALPRAZOLAM 0.5 MG/1
1 TABLET ORAL ONCE
Status: COMPLETED | OUTPATIENT
Start: 2020-07-04 | End: 2020-07-04

## 2020-07-04 RX ORDER — ALPRAZOLAM 0.5 MG/1
1 TABLET ORAL NIGHTLY PRN
Status: DISCONTINUED | OUTPATIENT
Start: 2020-07-04 | End: 2020-07-05 | Stop reason: HOSPADM

## 2020-07-04 RX ORDER — METAXALONE 800 MG/1
800 TABLET ORAL 3 TIMES DAILY
Status: DISCONTINUED | OUTPATIENT
Start: 2020-07-04 | End: 2020-07-05 | Stop reason: HOSPADM

## 2020-07-04 RX ORDER — POLYETHYLENE GLYCOL 3350 17 G/17G
17 POWDER, FOR SOLUTION ORAL 2 TIMES DAILY
Status: DISCONTINUED | OUTPATIENT
Start: 2020-07-04 | End: 2020-07-05 | Stop reason: HOSPADM

## 2020-07-04 RX ADMIN — GABAPENTIN 200 MG: 100 CAPSULE ORAL at 13:39

## 2020-07-04 RX ADMIN — SENNOSIDES 8.6 MG: 8.6 TABLET, FILM COATED ORAL at 21:12

## 2020-07-04 RX ADMIN — METAXALONE 800 MG: 800 TABLET ORAL at 13:46

## 2020-07-04 RX ADMIN — SODIUM CHLORIDE: 9 INJECTION, SOLUTION INTRAVENOUS at 21:15

## 2020-07-04 RX ADMIN — HYDROMORPHONE HYDROCHLORIDE 1.5 MG: 1 INJECTION, SOLUTION INTRAMUSCULAR; INTRAVENOUS; SUBCUTANEOUS at 20:07

## 2020-07-04 RX ADMIN — METAXALONE 800 MG: 800 TABLET ORAL at 21:12

## 2020-07-04 RX ADMIN — CEFEPIME HYDROCHLORIDE 2 G: 2 INJECTION, POWDER, FOR SOLUTION INTRAVENOUS at 19:04

## 2020-07-04 RX ADMIN — KETOROLAC TROMETHAMINE 15 MG: 15 INJECTION, SOLUTION INTRAMUSCULAR; INTRAVENOUS at 02:59

## 2020-07-04 RX ADMIN — SENNOSIDES 8.6 MG: 8.6 TABLET, FILM COATED ORAL at 10:32

## 2020-07-04 RX ADMIN — KETOROLAC TROMETHAMINE 15 MG: 15 INJECTION, SOLUTION INTRAMUSCULAR; INTRAVENOUS at 09:00

## 2020-07-04 RX ADMIN — HYDROMORPHONE HYDROCHLORIDE 1.5 MG: 1 INJECTION, SOLUTION INTRAMUSCULAR; INTRAVENOUS; SUBCUTANEOUS at 16:48

## 2020-07-04 RX ADMIN — HYDROMORPHONE HYDROCHLORIDE: 1 INJECTION, SOLUTION INTRAMUSCULAR; INTRAVENOUS; SUBCUTANEOUS at 23:24

## 2020-07-04 RX ADMIN — KETOROLAC TROMETHAMINE 15 MG: 15 INJECTION, SOLUTION INTRAMUSCULAR; INTRAVENOUS at 15:37

## 2020-07-04 RX ADMIN — LORAZEPAM 1 MG: 1 TABLET ORAL at 08:51

## 2020-07-04 RX ADMIN — METAXALONE 800 MG: 800 TABLET ORAL at 08:52

## 2020-07-04 RX ADMIN — CEFEPIME HYDROCHLORIDE 2 G: 2 INJECTION, POWDER, FOR SOLUTION INTRAVENOUS at 06:57

## 2020-07-04 RX ADMIN — HYDROMORPHONE HYDROCHLORIDE 1.25 MG: 1 INJECTION, SOLUTION INTRAMUSCULAR; INTRAVENOUS; SUBCUTANEOUS at 13:35

## 2020-07-04 RX ADMIN — HYDROMORPHONE HYDROCHLORIDE 1 MG: 1 INJECTION, SOLUTION INTRAMUSCULAR; INTRAVENOUS; SUBCUTANEOUS at 06:57

## 2020-07-04 RX ADMIN — HYDROMORPHONE HYDROCHLORIDE 1 MG: 1 INJECTION, SOLUTION INTRAMUSCULAR; INTRAVENOUS; SUBCUTANEOUS at 00:51

## 2020-07-04 RX ADMIN — HYDROMORPHONE HYDROCHLORIDE 1.25 MG: 1 INJECTION, SOLUTION INTRAMUSCULAR; INTRAVENOUS; SUBCUTANEOUS at 10:33

## 2020-07-04 RX ADMIN — ALPRAZOLAM 1 MG: 0.5 TABLET ORAL at 21:12

## 2020-07-04 RX ADMIN — LORAZEPAM 1 MG: 1 TABLET ORAL at 13:46

## 2020-07-04 RX ADMIN — POLYETHYLENE GLYCOL 3350 17 G: 17 POWDER, FOR SOLUTION ORAL at 10:33

## 2020-07-04 RX ADMIN — GABAPENTIN 200 MG: 100 CAPSULE ORAL at 08:51

## 2020-07-04 RX ADMIN — ALPRAZOLAM 1 MG: 0.5 TABLET ORAL at 16:48

## 2020-07-04 RX ADMIN — HYDROMORPHONE HYDROCHLORIDE 1 MG: 1 INJECTION, SOLUTION INTRAMUSCULAR; INTRAVENOUS; SUBCUTANEOUS at 04:00

## 2020-07-04 RX ADMIN — LABETALOL HYDROCHLORIDE 10 MG: 5 INJECTION INTRAVENOUS at 15:37

## 2020-07-04 RX ADMIN — GABAPENTIN 200 MG: 100 CAPSULE ORAL at 21:12

## 2020-07-04 ASSESSMENT — PAIN DESCRIPTION - PROGRESSION

## 2020-07-04 ASSESSMENT — PAIN DESCRIPTION - ORIENTATION
ORIENTATION: LOWER;MID
ORIENTATION: LOWER;MID

## 2020-07-04 ASSESSMENT — PAIN DESCRIPTION - DIRECTION
RADIATING_TOWARDS: LEGS
RADIATING_TOWARDS: LEGS

## 2020-07-04 ASSESSMENT — PAIN SCALES - GENERAL
PAINLEVEL_OUTOF10: 10
PAINLEVEL_OUTOF10: 10
PAINLEVEL_OUTOF10: 9
PAINLEVEL_OUTOF10: 9
PAINLEVEL_OUTOF10: 7
PAINLEVEL_OUTOF10: 9
PAINLEVEL_OUTOF10: 10
PAINLEVEL_OUTOF10: 9
PAINLEVEL_OUTOF10: 0
PAINLEVEL_OUTOF10: 10
PAINLEVEL_OUTOF10: 9
PAINLEVEL_OUTOF10: 10
PAINLEVEL_OUTOF10: 7

## 2020-07-04 ASSESSMENT — PAIN - FUNCTIONAL ASSESSMENT: PAIN_FUNCTIONAL_ASSESSMENT: PREVENTS OR INTERFERES SOME ACTIVE ACTIVITIES AND ADLS

## 2020-07-04 ASSESSMENT — PAIN DESCRIPTION - LOCATION
LOCATION: BACK
LOCATION: BACK

## 2020-07-04 ASSESSMENT — PAIN DESCRIPTION - DESCRIPTORS
DESCRIPTORS: SHARP;SHOOTING
DESCRIPTORS: SHARP;SHOOTING

## 2020-07-04 ASSESSMENT — PAIN DESCRIPTION - PAIN TYPE
TYPE: ACUTE PAIN
TYPE: ACUTE PAIN

## 2020-07-04 ASSESSMENT — PAIN DESCRIPTION - FREQUENCY
FREQUENCY: CONTINUOUS
FREQUENCY: CONTINUOUS

## 2020-07-04 ASSESSMENT — PAIN DESCRIPTION - ONSET
ONSET: ON-GOING
ONSET: ON-GOING

## 2020-07-04 NOTE — PROGRESS NOTES
7/4/20  2PM    Patient seen and evaluated      Probable abscess  Surgery only if patients neuro deteriorates    Rec: medical management   Cultures pending      Joy Magallanes MD    [phone]

## 2020-07-04 NOTE — PLAN OF CARE
Problem: Pain:  Goal: Pain level will decrease  Description: Pain level will decrease  7/3/2020 2354 by Pauly Velez RN  Outcome: Ongoing     Problem: Falls - Risk of:  Goal: Will remain free from falls  Description: Will remain free from falls  7/3/2020 2354 by Pauly Velez RN  Outcome: Ongoing     Problem: Substance Abuse:  Goal: Absence of drug withdrawal signs and symptoms  Description: Absence of drug withdrawal signs and symptoms  7/3/2020 2354 by Pauly Velez RN  Outcome: Ongoing

## 2020-07-04 NOTE — PROGRESS NOTES
Hospitalist Progress Note      PCP: No primary care provider on file. Date of Admission: 7/1/2020    Chief Complaint: Back pain    Hospital Course: Ms. Chase Oliver was admitted and obtained IR guided lumbosacral disc sampling for culture prior to initiation of empiric antibiotics. She was continued on vancomycin and cefepime until Enterobacter species sensitivities returned. ID was involved in the management of antibiotic therapy. Subjective: Patient complaining of uncontrolled back pain and anxiety. Considered leaving AMA as she had not heard back from neurosurgery about the plan for her MRI as well as for pain. Medications:  Reviewed    Infusion Medications    sodium chloride 75 mL/hr at 07/03/20 2048     Scheduled Medications    metaxalone  800 mg Oral TID    senna  1 tablet Oral BID    polyethylene glycol  17 g Oral BID    ALPRAZolam  1 mg Oral Once    gabapentin  200 mg Oral TID    venlafaxine  75 mg Oral Daily    sodium chloride flush  10 mL Intravenous 2 times per day    cefepime  2 g Intravenous Q12H     PRN Meds: HYDROmorphone, ALPRAZolam, naloxone, sodium chloride flush, polyethylene glycol, ondansetron, labetalol, ketorolac      Intake/Output Summary (Last 24 hours) at 7/4/2020 1630  Last data filed at 7/3/2020 2052  Gross per 24 hour   Intake 569 ml   Output 500 ml   Net 69 ml       Exam:    BP (!) 167/118   Pulse 87   Temp 99.6 °F (37.6 °C)   Resp 16   Ht 5' 4\" (1.626 m)   Wt 143 lb 4.8 oz (65 kg)   SpO2 98%   BMI 24.60 kg/m²     General appearance: No apparent distress, appears stated age and cooperative. HEENT: Pupils equal, round, and reactive to light. Conjunctivae/corneas clear. Neck: Supple, with full range of motion. No jugular venous distention. Trachea midline. Respiratory:  Normal respiratory effort. Clear to auscultation, bilaterally without Rales/Wheezes/Rhonchi.   Cardiovascular: Regular rate and rhythm with normal S1/S2 without murmurs, rubs or gallops. Abdomen: Soft, non-tender, non-distended with normal bowel sounds. Musculoskeletal: No clubbing, cyanosis or edema bilaterally. Full range of motion without deformity. Skin: Skin color, texture, turgor normal.  No rashes or lesions. Neurologic:  Neurovascularly intact without any focal sensory/motor deficits. Cranial nerves: II-XII intact, grossly non-focal.  Lower back tenderness over the spine and paraspinal muscles  Psychiatric: Alert and oriented, thought content appropriate, normal insight  Capillary Refill: Brisk,< 3 seconds   Peripheral Pulses: +2 palpable, equal bilaterally       Labs:   Recent Labs     07/02/20  0827 07/03/20  0751 07/04/20  0825   WBC 16.0* 16.1* 14.1*   HGB 12.5 11.9* 12.4   HCT 38.0 36.4 37.6    281 267     Recent Labs     07/02/20  0827 07/03/20  0751 07/04/20  0825   * 129* 134*   K 4.2 3.7 3.9   CL 97* 96* 100   CO2 22 21 23   BUN 7 7 11   CREATININE <0.5* <0.5* <0.5*   CALCIUM 8.8 8.5 8.4     No results for input(s): AST, ALT, BILIDIR, BILITOT, ALKPHOS in the last 72 hours. No results for input(s): INR in the last 72 hours. No results for input(s): Ming Service in the last 72 hours. Urinalysis:    No results found for: [place], BACTERIA, RBCUA, BLOODU, SPECGRAV, Barbara [place] 994    Radiology:  MRI LUMBAR SPINE W WO CONTRAST   Final Result   Discitis osteomyelitis at L5-S1. Phlegmon extends into the prevertebral soft   tissues along the bilateral L5-S1 foramina and into the S1 foramina as well   along the course of the S1 nerve roots. Loculated collection identified at L5-S1 may represent a extruded disc versus   loculated epidural abscess. This measures up to 1.7 cm greatest dimension. .      Otherwise mild-to-moderate changes lower spine. CT PERC ASP PARAVERT TISS/NUC PULP/INTERVERT DISC   Final Result   Successful CT guided L5-S1 disc aspiration/biopsy.          CT GUIDED NEEDLE PLACEMENT   Final Result      CT LUMBAR SPINE W CONTRAST Final Result   Findings consistent with discitis/osteomyelitis at L5-S1 with erosion of the   adjacent endplates. Soft tissue prominence in the ventral epidural space posterior to S1,   epidural phlegmon not excluded. RECOMMENDATIONS:   MRI with and without contrast for further evaluation. XR CHEST PORTABLE   Final Result   No acute findings. Assessment/Plan:    Active Hospital Problems    Diagnosis Date Noted    Discitis [M46.40] 07/01/2020       L5-S1 discitis and osteomyelitis:  -Spinal disc culture growing Enterobacter. Sensitivities pending  -Holding antibiotic therapy until sample obtained. ID consulted to guide antibiotic recommendations  -Blood cultures ordered. If return positive will need TTE to rule out endocarditis  -Does not currently have neurologic symptoms of cord compression at this time.  -Neuro surgery consulted for further evaluation  -MRI shows phlegmon and disc herniation versus abscess. Neurosurgery suspects likely abscess but no drainage having unless neurologic compromise     IV drug use history:  -Patient threatened to leave AMA. Initially low-dose Dilaudid ordered. Initially low-dose Ativan also ordered for anxiety and potential withdrawal from polysubstance abuse  -Attempting to wean controlled substance medications on a daily basis as tolerated. However patient with pain and degenerative disc disease and infection  -Patient requested changing from Ativan to Xanax. Attempt daily dosing. Also complaining of uncontrolled pain on 1 mg Dilaudid every 3. Did not tolerate increased to 1.25 mg every 3 hours and so I increased to 1.5 mg every 3 hours. I did talk to the patient about tolerance with pain medications.     Lactic acidosis: Resolved  -Likely due to infection  -Resolved with fluids. Continue to monitor    DVT Prophylaxis: Lovenox  Diet: DIET GENERAL;  Code Status: Full Code    PT/OT Eval Status: Not applicable    Dispo -MedSurg.   No surgery planned. Waiting for final recommendations from infectious disease for placing PICC line. Patient will need placement to SNF. Anticipate fentanyl patch for pain control on discharge.     Derek Zambrano MD

## 2020-07-05 LAB
BLOOD CULTURE, ROUTINE: NORMAL
CULTURE, BLOOD 2: NORMAL

## 2020-07-05 NOTE — DISCHARGE SUMMARY
Hospital Medicine Discharge Summary    Patient ID: Isabel Munoz      Patient's PCP: No primary care provider on file. Admit Date: 7/1/2020     Discharge Date: 7/5/2020      Admitting Physician: Shruthi Garcia MD     Discharge Physician: Winnie Ying MD     Discharge Diagnoses: Active Hospital Problems    Diagnosis Date Noted    Discitis [M46.40] 07/01/2020       The patient was seen and examined on day of discharge and this discharge summary is in conjunction with any daily progress note from day of discharge. Hospital Course:     Ms. Rosana Couch was admitted and obtained IR guided lumbosacral disc sampling for culture prior to initiation of empiric antibiotics. She was continued on vancomycin and cefepime until Enterobacter species sensitivities returned. ID was involved in the management of antibiotic therapy who deescalated to cefepime. Neurosurgery was consulted who determined that based on MRI, she had a likely abscess, but given lack of neurologic compromise, they deferred to antibiotic management only. Patient stated frustration about pain. She was escalated to dilaudid 1.5mg IV q3hr PRN and Xanax 1mg qhs, but she stated this did not help her pain. She eventually left in the middle of the night by wheelchair against medical advice after only 3 days if IV antibiotic therapy. Exam:     BP (!) 145/104   Pulse 96   Temp 100.9 °F (38.3 °C) (Oral)   Resp 20   Ht 5' 4\" (1.626 m)   Wt 143 lb 4.8 oz (65 kg)   SpO2 97%   BMI 24.60 kg/m²     General appearance: No apparent distress, appears stated age and cooperative. HEENT: Pupils equal, round, and reactive to light. Conjunctivae/corneas clear. Neck: Supple, with full range of motion. No jugular venous distention. Trachea midline. Respiratory:  Normal respiratory effort. Clear to auscultation, bilaterally without Rales/Wheezes/Rhonchi.   Cardiovascular: Regular rate and rhythm with normal S1/S2 without murmurs, rubs or gallops. Abdomen: Soft, non-tender, non-distended with normal bowel sounds. Musculoskelatal: No clubbing, cyanosis or edema bilaterally. Full range of motion without deformity. Skin: Skin color, texture, turgor normal.  No rashes or lesions. Neurologic:  Neurovascularly intact without any focal sensory/motor deficits. Cranial nerves: II-XII intact, grossly non-focal.  Psychiatric: Alert and oriented, thought content appropriate, normal insight      Consults:     PHARMACY TO DOSE VANCOMYCIN  IP CONSULT TO INFECTIOUS DISEASES  IP CONSULT TO NEUROSURGERY  IP CONSULT TO NEUROSURGERY    Significant Diagnostic Studies:          Radiology:  MRI LUMBAR SPINE W WO CONTRAST   Final Result   Discitis osteomyelitis at L5-S1. Phlegmon extends into the prevertebral soft   tissues along the bilateral L5-S1 foramina and into the S1 foramina as well   along the course of the S1 nerve roots.       Loculated collection identified at L5-S1 may represent a extruded disc versus   loculated epidural abscess. This measures up to 1.7 cm greatest dimension. .       Otherwise mild-to-moderate changes lower spine.           CT PERC ASP PARAVERT TISS/NUC PULP/INTERVERT DISC   Final Result   Successful CT guided L5-S1 disc aspiration/biopsy.           CT GUIDED NEEDLE PLACEMENT   Final Result       CT LUMBAR SPINE W CONTRAST   Final Result   Findings consistent with discitis/osteomyelitis at L5-S1 with erosion of the   adjacent endplates.       Soft tissue prominence in the ventral epidural space posterior to S1,   epidural phlegmon not excluded.       RECOMMENDATIONS:   MRI with and without contrast for further evaluation.           XR CHEST PORTABLE   Final Result   No acute findings         PCP/SNF to follow up: Return to the [address] [place] [address] for treatment of life threatening osteomyelitis with abscess.     Disposition:  Left AMA    Condition on D/C:  Stable    Discharge Instructions/Follow-up:  Return to the hospital IMMEDIATELY for treatment of infection    Code Status:  Prior     Activity: activity as tolerated    Diet: regular    Labs: For convenience and continuity at follow-up the following most recent labs are provided:      CBC:    Lab Results   Component Value Date    WBC 14.1 07/04/2020    HGB 12.4 07/04/2020    HCT 37.6 07/04/2020     07/04/2020       Renal:    Lab Results   Component Value Date     07/04/2020    K 3.9 07/04/2020     07/04/2020    CO2 23 07/04/2020    BUN 11 07/04/2020    CREATININE <0.5 07/04/2020    CALCIUM 8.4 07/04/2020       Discharge Medications:     Discharge Medication List as of 7/5/2020  1:16 AM          Time Spent on discharge is more than 20 minutes in the examination, evaluation, counseling and review of medications and discharge plan. Signed: Sada Borden MD   7/5/2020      Thank you No primary care provider on file. for the opportunity to be involved in this patient's care. If you have any questions or concerns please feel free to contact me at [phone].

## 2020-07-05 NOTE — CONSULTS
[address] [place] [place] [address]                                  CONSULTATION    PATIENT NAME: Karen Denney                    :        1985  MED REC NO:   [id number]                          ROOM:       2621  ACCOUNT NO:   [de-identified]                           ADMIT DATE: 2020  PROVIDER:     Sergey Salas MD    CONSULT DATE:  2020    HISTORY:  This patient is a 60-year-old woman, who has a history of  addiction and abuse, who was admitted with back pain on 2020 and  found to have an abscess in the lumbar spine at L5-S1. She has been  placed on empiric antibiotics. Cultures have been performed and are  pending and a needle aspirate was performed at L5-S1. Asked to evaluate  the patient for additional treatment options. The patient is  complaining of pain. She is obtaining Dilaudid and nothing is relieving  her pain. Her pain is diffuse. It is in her back and then she states  it is in her legs. The patient is not incontinent. She is using a  bedpan. PHYSICAL EXAMINATION:  MUSCULOSKELETAL:  The patient on examination is in a significant degree  of pain. Moving in bed is painful. Straight leg raise is painful in  the back and then, she states it hurts down the legs, but I am able to  straight leg raise her up to almost 90 degrees on either side. Her  reflexes, ankle reflex is diminished on the right side, but she has  reasonably good pain-limited strength in all motor groups of the lower  extremities on bedside testing. She does not have any sensory loss in  her legs. DIAGNOSTIC STUDIES:  I reviewed the MRI scan. There is discitis and a  probable epidural mass that is circumscribed on the right side,  inferiorly migrated.   It is focal on the right side like a disc  herniation, which was included in the differential diagnosis, but that  does not fit into the clinical syndrome. ASSESSMENT AND PLAN:  The patient does not have cauda equina  compression. She has pain only and she does not have any definite focal  significant deficits. Recommend continued medical treatment. There is  no indication to consider surgical intervention. Valentino Ponder, MD    D: 07/04/2020 13:45:36       T: 07/04/2020 14:45:13     [id number]  Job#: [id number]     Doc#: [id number]    CC:   Temo Hutchins MD

## 2020-07-06 LAB
ANAEROBIC CULTURE: ABNORMAL
CULTURE SURGICAL: ABNORMAL
GRAM STAIN RESULT: ABNORMAL
ORGANISM: ABNORMAL

## 2020-07-08 NOTE — PROGRESS NOTES
Physician Progress Note      PATIENT:               Rebeka Bear  CSN #:                  [id number]  :                       1985  ADMIT DATE:       2020 2:12 AM  DISCH DATE:        2020 12:00 AM  RESPONDING  PROVIDER #:        Violeta Heath MD          QUERY TEXT:    [facility]    Pt admitted with L5-S1 discitis and osteomyelitis. Pt noted to have SIRS   Criteria. If possible, please document in the progress notes and discharge   summary if you are evaluating and /or treating any of the following: The medical record reflects the following:  Risk Factors: L5-S1 discitis and osteomyelitis left AMA a week ago history   IVDA  Clinical Indicators: WBC 12.5, Lactic acid 2.3 , HR on admit 96, per ED   Provider \" Septicemia \"  Treatment: 2L NS Bolus in ED, IVFs NS @ 75ml/hr., and IV  Cefepime, IV Vanco   and ID Consult  Options provided:  -- Sepsis, present on admission  -- Sepsis, now resolved, POA: This patient was treated for ) sepsis this   admission, which is now resolved. -- No Sepsis, localized infection only *** (if known)  -- Refer to Clinical Documentation Reviewer    PROVIDER RESPONSE TEXT:    Sepsis, now resolved, POA: This patient was treated for ) sepsis this   admission, which is now resolved.     Query created by: Rachael Ruiz on 2020 8:58 AM      Electronically signed by:  Violeta Heath MD 2020 10:47 AM

## 2020-07-28 NOTE — ED NOTES
Patient presents to ER with complaints of back pain, was seen at [facility] and left AMA on Thursday, was told she has an infection on her spine. 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       Patient is alert and oriented x4, actively moving all extremities  EKG complete and shown to MD per protocol by this RN  20 G IV placed to [facility] on first attempt, patient tolerated well, labs obtained and sent    Xray at bedside     [facility], [address] [place] [address]  07/01/20 7521 SPOKE WITH PATIENT AND SCHEDULE APT WITH RESIDENT WITH DR. CARDOZO SUPERVISING ON THIS Friday

## 2020-10-23 ENCOUNTER — HOSPITAL ENCOUNTER (INPATIENT)
Age: 35
LOS: 4 days | Discharge: HOME | DRG: 049 | End: 2020-10-27
Attending: INTERNAL MEDICINE
Payer: MEDICAID

## 2020-10-23 PROBLEM — M46.20 SPINAL ABSCESS (HCC): Status: ACTIVE | Noted: 2020-10-23

## 2020-10-23 PROCEDURE — 36415 COLL VENOUS BLD VENIPUNCTURE: CPT

## 2020-10-23 PROCEDURE — 2580000003 HC RX 258: Performed by: STUDENT IN AN ORGANIZED HEALTH CARE EDUCATION/TRAINING PROGRAM

## 2020-10-23 PROCEDURE — 87040 BLOOD CULTURE FOR BACTERIA: CPT

## 2020-10-23 PROCEDURE — 1200000000 HC SEMI PRIVATE

## 2020-10-23 PROCEDURE — 6370000000 HC RX 637 (ALT 250 FOR IP): Performed by: STUDENT IN AN ORGANIZED HEALTH CARE EDUCATION/TRAINING PROGRAM

## 2020-10-23 PROCEDURE — 6360000002 HC RX W HCPCS: Performed by: STUDENT IN AN ORGANIZED HEALTH CARE EDUCATION/TRAINING PROGRAM

## 2020-10-23 RX ORDER — ACETAMINOPHEN 325 MG/1
650 TABLET ORAL EVERY 6 HOURS PRN
Status: DISCONTINUED | OUTPATIENT
Start: 2020-10-23 | End: 2020-10-23

## 2020-10-23 RX ORDER — SODIUM CHLORIDE 0.9 % (FLUSH) 0.9 %
10 SYRINGE (ML) INJECTION EVERY 12 HOURS SCHEDULED
Status: DISCONTINUED | OUTPATIENT
Start: 2020-10-23 | End: 2020-10-27 | Stop reason: HOSPADM

## 2020-10-23 RX ORDER — ACETAMINOPHEN 650 MG/1
650 SUPPOSITORY RECTAL EVERY 6 HOURS PRN
Status: DISCONTINUED | OUTPATIENT
Start: 2020-10-23 | End: 2020-10-23

## 2020-10-23 RX ORDER — SODIUM CHLORIDE 0.9 % (FLUSH) 0.9 %
10 SYRINGE (ML) INJECTION PRN
Status: DISCONTINUED | OUTPATIENT
Start: 2020-10-23 | End: 2020-10-27 | Stop reason: HOSPADM

## 2020-10-23 RX ORDER — OXYCODONE HYDROCHLORIDE AND ACETAMINOPHEN 5; 325 MG/1; MG/1
1 TABLET ORAL EVERY 6 HOURS PRN
Status: DISCONTINUED | OUTPATIENT
Start: 2020-10-23 | End: 2020-10-24

## 2020-10-23 RX ADMIN — SODIUM CHLORIDE, PRESERVATIVE FREE 10 ML: 5 INJECTION INTRAVENOUS at 23:39

## 2020-10-23 RX ADMIN — PIPERACILLIN AND TAZOBACTAM 3.38 G: 3; .375 INJECTION, POWDER, LYOPHILIZED, FOR SOLUTION INTRAVENOUS at 23:39

## 2020-10-23 RX ADMIN — SODIUM CHLORIDE, SODIUM LACTATE, POTASSIUM CHLORIDE, AND CALCIUM CHLORIDE: .6; .31; .03; .02 INJECTION, SOLUTION INTRAVENOUS at 23:39

## 2020-10-23 RX ADMIN — OXYCODONE HYDROCHLORIDE AND ACETAMINOPHEN 1 TABLET: 5; 325 TABLET ORAL at 23:32

## 2020-10-23 ASSESSMENT — PAIN DESCRIPTION - LOCATION
LOCATION: BACK
LOCATION: BACK

## 2020-10-23 ASSESSMENT — PAIN DESCRIPTION - FREQUENCY
FREQUENCY: CONTINUOUS
FREQUENCY: CONTINUOUS

## 2020-10-23 ASSESSMENT — PAIN DESCRIPTION - PAIN TYPE
TYPE: ACUTE PAIN
TYPE: ACUTE PAIN

## 2020-10-23 ASSESSMENT — PAIN DESCRIPTION - DESCRIPTORS
DESCRIPTORS: ACHING
DESCRIPTORS: ACHING

## 2020-10-23 ASSESSMENT — PAIN DESCRIPTION - PROGRESSION: CLINICAL_PROGRESSION: GRADUALLY WORSENING

## 2020-10-23 ASSESSMENT — PAIN SCALES - GENERAL
PAINLEVEL_OUTOF10: 6
PAINLEVEL_OUTOF10: 8

## 2020-10-23 ASSESSMENT — PAIN - FUNCTIONAL ASSESSMENT: PAIN_FUNCTIONAL_ASSESSMENT: ACTIVITIES ARE NOT PREVENTED

## 2020-10-23 ASSESSMENT — PAIN DESCRIPTION - ONSET
ONSET: ON-GOING
ONSET: ON-GOING

## 2020-10-23 ASSESSMENT — PAIN DESCRIPTION - ORIENTATION
ORIENTATION: LOWER
ORIENTATION: LOWER

## 2020-10-23 NOTE — H&P
Hospital Medicine History & Physical      PCP: No primary care provider on file. Date of Admission: 10/23/2020    Date of Service: Pt seen/examined on 10/23/2020 and Admitted to Inpatient    Chief Complaint: Back pain      History Of Present Illness: The patient is a 28 y.o. female with past medical history notably with previous heroin abuse and previous herniated lumbar disc who presents to [facility] as a transfer from [facility] [place] ED after being sent there from [facility] for concern for patient having worsening persistence recurrent lumbar back pain as well as having abnormal labs suggestive of infection. Patient had apparently been admitted both at [facility] and [facility] [place] around  for a spinal abscess/infection and was given IV antibiotics but did not stay for the full 6-week course and effectively only got maybe a few days course of antibiotics. She had been doing fine according to her and the back pain had improved however it started to get much worse later on and then for the past month or so she has been in [facility] and so has not had any medical care up until now. She admits to the back pain, denies any other fever, chills, nausea/vomiting/diarrhea/abdominal pain, syncope, vision changes, focal weakness, sweats, rashes. She denies any current use of IV drugs but did use heroin via IV in the past.  Patient was apparently declined for transfer to , neurosurgery was discussed about the case but did not feel it was surgical at this time.     Past Medical History:        Diagnosis Date    Herniated lumbar intervertebral disc        Past Surgical History:        Procedure Laterality Date    CARPAL TUNNEL RELEASE Bilateral      SECTION      CHOLECYSTECTOMY         Medications Prior to Admission:    Prior to intact with sensory/motor intact upper extremities/lower extremities, bilaterally. Cranial nerves: II-XII intact, grossly non-focal.  Mental status: Alert, oriented, thought content appropriate. Capillary Refill: Acceptable  < 3 seconds  Peripheral Pulses: +3 Easily felt, not easily obliterated with pressure      CXR: Negative  MRI lumbar spine with and without contrast:  There has been interval change in the appearance of the lumbosacral interspace since 2016. There is now near loss of disc material with prominent fatty endplate changes. There is a small amount of residual edema and enhancement at the endplates suggestive of some degree of persistent or recurrent infection. Findings are mostly thought to be consistent with the sequela of prior discitis/osteomyelitis. In the anterior spine paraspinal tissue, there is a probable 8 mm abscess cavity. There is no evidence of posterior/epidural abscess. There is however some lateral recess and neural foraminal narrowing which is most pronounced in the left lateral recess where there is possible mass-effect upon the left S1 nerve root as previously described. Relatively stable disc spondylosis L3 and L4 interspaces also seen. CBC   Recent Labs     10/24/20  0532   WBC 11.6*   HGB 12.0   HCT 36.5         RENAL  Recent Labs     10/24/20  0532      K 4.2      CO2 25   BUN 15   CREATININE 0.7     LFT'S  No results for input(s): AST, ALT, ALB, BILIDIR, BILITOT, ALKPHOS in the last 72 hours. COAG  No results for input(s): INR in the last 72 hours. CARDIAC ENZYMES  No results for input(s): CKTOTAL, CKMB, CKMBINDEX, TROPONINI in the last 72 hours.     U/A:  No results found for: NITRITE, COLORU, WBCUA, RBCUA, MUCUS, BACTERIA, CLARITYU, SPECGRAV, LEUKOCYTESUR, BLOODU, GLUCOSEU, AMORPHOUS    ABG  No results found for: PZT2MQI, BEART, O4ZTWYXY, PHART, THGBART, XMN0INX, PO2ART, YRJ0GCH        Active Hospital Problems    Diagnosis Date Noted    Spinal abscess [facility]) [M46.20] 10/23/2020         PHYSICIANS CERTIFICATION:    I certify that Lenny Schmidt is expected to be hospitalized for greater than 2 midnights based on the following assessment and plan:      ASSESSMENT/PLAN:  · Start vancomycin and Zosyn for broad coverage  · LR at 125/h  · Percocet every 6 as needed for back pain  · Consult neurosurgery  · Consult infectious disease  · N.p.o. for now after midnight pending consultation and further evaluation  · Obtain transthoracic echo to evaluate for other source of infection    DVT Prophylaxis: Lovenox  Diet: Diet NPO, After Midnight  Code Status: Full Code  PT/OT Eval Status: Ambulatory    Dispo -antibiotics and pending clinical course and consultation       Marcus Mahajan, DO    Thank you No primary care provider on file. for the opportunity to be involved in this patient's care. If you have any questions or concerns please feel free to contact me at [phone].

## 2020-10-23 NOTE — LETTER
[address] [facility]  Phone: [phone]         October 26, 2020     Patient: Luna Ascnecio   YOB: 1985   Date of Visit: 10/23/2020       To Whom It May Concern:    Salty Paredes was seen and treated at our facility on 10/23/2020 and left the hospital 10/26/2020.          Sincerely,        Abel Ingram RN        Signature:__________________________________

## 2020-10-23 NOTE — LETTER
[address] [facility]  Phone: [phone]         October 26, 2020     Patient: Malick Hermosillo   YOB: 1985   Date of Visit: 10/23/2020       To Whom It May Concern:    Dileep Zapata was seen and treated at our facility on 10/23/2020 and left the hospital 10/26/2020.       Sincerely,        Cristiano Barbour RN        Signature:__________________________________

## 2020-10-24 PROBLEM — M54.50 CHRONIC MIDLINE LOW BACK PAIN WITHOUT SCIATICA: Status: ACTIVE | Noted: 2020-10-24

## 2020-10-24 PROBLEM — F19.90 IVDU (INTRAVENOUS DRUG USER): Status: ACTIVE | Noted: 2020-10-24

## 2020-10-24 PROBLEM — M54.50 LOWER BACK PAIN: Status: ACTIVE | Noted: 2020-10-24

## 2020-10-24 PROBLEM — G89.29 CHRONIC MIDLINE LOW BACK PAIN WITHOUT SCIATICA: Status: ACTIVE | Noted: 2020-10-24

## 2020-10-24 LAB
ANION GAP SERPL CALCULATED.3IONS-SCNC: 9 MMOL/L (ref 3–16)
BASOPHILS ABSOLUTE: 0.1 K/UL (ref 0–0.2)
BASOPHILS RELATIVE PERCENT: 0.8 %
BUN BLDV-MCNC: 15 MG/DL (ref 7–20)
CALCIUM SERPL-MCNC: 8.8 MG/DL (ref 8.3–10.6)
CHLORIDE BLD-SCNC: 104 MMOL/L (ref 99–110)
CO2: 25 MMOL/L (ref 21–32)
CREAT SERPL-MCNC: 0.7 MG/DL (ref 0.6–1.1)
EOSINOPHILS ABSOLUTE: 0.4 K/UL (ref 0–0.6)
EOSINOPHILS RELATIVE PERCENT: 3.1 %
GFR AFRICAN AMERICAN: >60
GFR NON-AFRICAN AMERICAN: >60
GLUCOSE BLD-MCNC: 77 MG/DL (ref 70–99)
HCT VFR BLD CALC: 36.5 % (ref 36–48)
HEMOGLOBIN: 12 G/DL (ref 12–16)
LV EF: 58 %
LVEF MODALITY: NORMAL
LYMPHOCYTES ABSOLUTE: 3 K/UL (ref 1–5.1)
LYMPHOCYTES RELATIVE PERCENT: 25.5 %
MAGNESIUM: 2.4 MG/DL (ref 1.8–2.4)
MCH RBC QN AUTO: 27.4 PG (ref 26–34)
MCHC RBC AUTO-ENTMCNC: 33 G/DL (ref 31–36)
MCV RBC AUTO: 83 FL (ref 80–100)
MONOCYTES ABSOLUTE: 0.9 K/UL (ref 0–1.3)
MONOCYTES RELATIVE PERCENT: 7.8 %
NEUTROPHILS ABSOLUTE: 7.3 K/UL (ref 1.7–7.7)
NEUTROPHILS RELATIVE PERCENT: 62.8 %
PDW BLD-RTO: 14.5 % (ref 12.4–15.4)
PLATELET # BLD: 249 K/UL (ref 135–450)
PMV BLD AUTO: 8.8 FL (ref 5–10.5)
POTASSIUM SERPL-SCNC: 4.2 MMOL/L (ref 3.5–5.1)
RBC # BLD: 4.39 M/UL (ref 4–5.2)
SODIUM BLD-SCNC: 138 MMOL/L (ref 136–145)
VANCOMYCIN TROUGH: 10.7 UG/ML (ref 10–20)
WBC # BLD: 11.6 K/UL (ref 4–11)

## 2020-10-24 PROCEDURE — 85025 COMPLETE CBC W/AUTO DIFF WBC: CPT

## 2020-10-24 PROCEDURE — 80048 BASIC METABOLIC PNL TOTAL CA: CPT

## 2020-10-24 PROCEDURE — 94760 N-INVAS EAR/PLS OXIMETRY 1: CPT

## 2020-10-24 PROCEDURE — 6360000002 HC RX W HCPCS: Performed by: STUDENT IN AN ORGANIZED HEALTH CARE EDUCATION/TRAINING PROGRAM

## 2020-10-24 PROCEDURE — 6370000000 HC RX 637 (ALT 250 FOR IP): Performed by: INTERNAL MEDICINE

## 2020-10-24 PROCEDURE — 80202 ASSAY OF VANCOMYCIN: CPT

## 2020-10-24 PROCEDURE — 93306 TTE W/DOPPLER COMPLETE: CPT

## 2020-10-24 PROCEDURE — 99255 IP/OBS CONSLTJ NEW/EST HI 80: CPT | Performed by: INTERNAL MEDICINE

## 2020-10-24 PROCEDURE — 36415 COLL VENOUS BLD VENIPUNCTURE: CPT

## 2020-10-24 PROCEDURE — 6370000000 HC RX 637 (ALT 250 FOR IP): Performed by: STUDENT IN AN ORGANIZED HEALTH CARE EDUCATION/TRAINING PROGRAM

## 2020-10-24 PROCEDURE — 1200000000 HC SEMI PRIVATE

## 2020-10-24 PROCEDURE — 2580000003 HC RX 258: Performed by: STUDENT IN AN ORGANIZED HEALTH CARE EDUCATION/TRAINING PROGRAM

## 2020-10-24 PROCEDURE — 83735 ASSAY OF MAGNESIUM: CPT

## 2020-10-24 RX ORDER — IBUPROFEN 400 MG/1
400 TABLET ORAL ONCE
Status: COMPLETED | OUTPATIENT
Start: 2020-10-24 | End: 2020-10-24

## 2020-10-24 RX ORDER — OXYCODONE HYDROCHLORIDE 10 MG/1
10 TABLET ORAL EVERY 4 HOURS PRN
Status: DISPENSED | OUTPATIENT
Start: 2020-10-24 | End: 2020-10-25

## 2020-10-24 RX ORDER — HYDROCODONE BITARTRATE AND ACETAMINOPHEN 5; 325 MG/1; MG/1
1 TABLET ORAL EVERY 6 HOURS PRN
Status: DISCONTINUED | OUTPATIENT
Start: 2020-10-24 | End: 2020-10-24

## 2020-10-24 RX ADMIN — PIPERACILLIN AND TAZOBACTAM 3.38 G: 3; .375 INJECTION, POWDER, LYOPHILIZED, FOR SOLUTION INTRAVENOUS at 06:02

## 2020-10-24 RX ADMIN — IBUPROFEN 400 MG: 400 TABLET, FILM COATED ORAL at 05:13

## 2020-10-24 RX ADMIN — OXYCODONE HYDROCHLORIDE AND ACETAMINOPHEN 1 TABLET: 5; 325 TABLET ORAL at 06:01

## 2020-10-24 RX ADMIN — OXYCODONE HYDROCHLORIDE 10 MG: 10 TABLET ORAL at 12:16

## 2020-10-24 RX ADMIN — METOPROLOL TARTRATE 25 MG: 25 TABLET, FILM COATED ORAL at 20:51

## 2020-10-24 RX ADMIN — Medication 1250 MG: at 10:36

## 2020-10-24 RX ADMIN — METOPROLOL TARTRATE 25 MG: 25 TABLET, FILM COATED ORAL at 12:16

## 2020-10-24 RX ADMIN — OXYCODONE HYDROCHLORIDE 10 MG: 10 TABLET ORAL at 16:41

## 2020-10-24 RX ADMIN — PIPERACILLIN AND TAZOBACTAM 3.38 G: 3; .375 INJECTION, POWDER, LYOPHILIZED, FOR SOLUTION INTRAVENOUS at 14:53

## 2020-10-24 RX ADMIN — OXYCODONE HYDROCHLORIDE 10 MG: 10 TABLET ORAL at 20:51

## 2020-10-24 RX ADMIN — Medication 1250 MG: at 20:51

## 2020-10-24 ASSESSMENT — PAIN DESCRIPTION - DESCRIPTORS
DESCRIPTORS: ACHING

## 2020-10-24 ASSESSMENT — PAIN DESCRIPTION - LOCATION
LOCATION: BACK
LOCATION: BACK;HEAD
LOCATION: BACK

## 2020-10-24 ASSESSMENT — PAIN DESCRIPTION - FREQUENCY
FREQUENCY: CONTINUOUS

## 2020-10-24 ASSESSMENT — PAIN DESCRIPTION - ONSET
ONSET: ON-GOING

## 2020-10-24 ASSESSMENT — PAIN SCALES - GENERAL
PAINLEVEL_OUTOF10: 7
PAINLEVEL_OUTOF10: 9
PAINLEVEL_OUTOF10: 6
PAINLEVEL_OUTOF10: 8
PAINLEVEL_OUTOF10: 7
PAINLEVEL_OUTOF10: 9
PAINLEVEL_OUTOF10: 7
PAINLEVEL_OUTOF10: 4
PAINLEVEL_OUTOF10: 7
PAINLEVEL_OUTOF10: 7
PAINLEVEL_OUTOF10: 5

## 2020-10-24 ASSESSMENT — PAIN DESCRIPTION - PROGRESSION
CLINICAL_PROGRESSION: GRADUALLY WORSENING
CLINICAL_PROGRESSION: NOT CHANGED
CLINICAL_PROGRESSION: NOT CHANGED
CLINICAL_PROGRESSION: GRADUALLY IMPROVING
CLINICAL_PROGRESSION: GRADUALLY IMPROVING

## 2020-10-24 ASSESSMENT — PAIN DESCRIPTION - ORIENTATION
ORIENTATION: LOWER

## 2020-10-24 ASSESSMENT — PAIN DESCRIPTION - PAIN TYPE
TYPE: ACUTE PAIN

## 2020-10-24 ASSESSMENT — PAIN - FUNCTIONAL ASSESSMENT
PAIN_FUNCTIONAL_ASSESSMENT: ACTIVITIES ARE NOT PREVENTED
PAIN_FUNCTIONAL_ASSESSMENT: ACTIVITIES ARE NOT PREVENTED

## 2020-10-24 NOTE — PROGRESS NOTES
Pt to room 3113 from [facility]. Oriented to room and call light. Call light and bedside table within reach.

## 2020-10-24 NOTE — PROGRESS NOTES
Patient is refusing to take the new pain medication. Patient states, \"Why did he give me norco instead of percocet? Percocet is stronger than norco.  I want a new doctor. \" Patient is also currently NPO and wanting to eat. Dr. Adrienne Kent aware and notified. Dr. Adrienne Kent stated that he would change the pain medication to oxycodone and that the patient can have a general diet. Dr. Adrienne Kent said that the patient could not probably get a new doctor until tomorrow. See new orders  Will continue to monitor and reassess.  Electronically signed by Joseph Wolfe RN on 10/24/2020

## 2020-10-24 NOTE — PROGRESS NOTES
[address] Day: 2   Diagnosis: Lumbar spinal abscess, History of recent IV drug abuse (heroin)  /75   Pulse 70   Temp 98.3 °F (36.8 °C) (Oral)   Resp 16   Ht 5' 4\" (1.626 m)   Wt 136 lb 11 oz (62 kg)   SpO2 96%   BMI 23.46 kg/m²     Patient is 29 y/o female with past history of LDDD and lower back pain, pain management with ITA's and has been on soboxone in the past for chronic back pain. She was here in June 2020 with spinal abscess, discitis/osteomyelitis at L5-S1, but did not stay to complete the necessary course of IV antibiotics. After discharge she reports that she was feeling fine for a few months, then developed recurrence and progression of back pain. No fever or chills. Hx of [place] heroin, but none recently. She has been incarcerated in [place] for the past month. Resting quietly in bed at the time of the exam. Repositions herself. Getting up her own power to use the bathroom/void. Complaining of lower back pain in the midline over the L5-S1 and sacral regions > frontal headache (reports periodic h/a when her blood pressure is not controlled due to back pain) and chest soreness. Pain moderately well controlled on current medications. Back pain currently 5-6/10. Neurological Exam:   Strength and sensation in the BLE intact and symmetric. No saddle anesthesia. Imaging Studies: Outside MRI imaging of the lumbar spine and unable to view the images at this time. Dr. Cruzito Lopes has reviewed imaging. Report per ER note:   MRI lumbar spine with and without contrast:  There has been interval change in the appearance of the lumbosacral interspace since 2016. There is now near loss of disc material with prominent fatty endplate changes. There is a small amount of residual edema and enhancement at the endplates suggestive of some degree of persistent or recurrent infection. Findings are mostly thought to be consistent with the sequela of prior discitis/osteomyelitis. In the anterior spine paraspinal tissue, there is a probable 8 mm abscess cavity. There is no evidence of posterior/epidural abscess. There is however some lateral recess and neural foraminal narrowing which is most pronounced in the left lateral recess where there is possible mass-effect upon the left S1 nerve root as previously described. Relatively stable disc spondylosis L3 and L4 interspaces also seen. Labs reviewed. WBC 11.6, otherwise normal CBC. Blood cultures done on 10/23 pending. Hep C (+) in July 2020. Med list, home meds and MAR reviewed. Assessment:    Case and images reviewed with Dr. Ramiro Park. Disc degeneration and endplate changes at Z3-Z0. Anterior spinal abscess with extension into the left neuroforamin, but no epidural or posterior abscess    Nonsurgical.  Agree with ID consult. Full consult to follow.   Dexter Sebastian RN

## 2020-10-24 NOTE — PROGRESS NOTES
4 Eyes Admission Assessment     I agree as the admission nurse that 2 RN's have performed a thorough Head to Toe Skin Assessment on the patient. ALL assessment sites listed below have been assessed on admission. Areas assessed by both nurses:   [x]   Head, Face, and Ears   [x]   Shoulders, Back, and Chest  [x]   Arms, Elbows, and Hands   [x]   Coccyx, Sacrum, and Ischum  [x]   Legs, Feet, and Heels        Does the Patient have Skin Breakdown?   No         Shashi Prevention initiated:  No   Wound Care Orders initiated:  No      [facility] nurse consulted for Pressure Injury (Stage 3,4, Unstageable, DTI, NWPT, and Complex wounds):  No      Nurse 1 eSignature: Electronically signed by Eliezer Buckley RN on 10/24/20 at 2:55 AM EDT    **SHARE this note so that the co-signing nurse is able to place an eSignature**    Nurse 2 eSignature: Electronically signed by Soni Ramos RN on 10/24/20 at 6:11 AM EDT

## 2020-10-24 NOTE — PROGRESS NOTES
per day, Marcus KOKO Garcia, DO, 10 mL at 10/23/20 2339    sodium chloride flush 0.9 % injection 10 mL, 10 mL, Intravenous, PRN, Marcus KOKO CarterRadha, DO    enoxaparin (LOVENOX) injection 40 mg, 40 mg, Subcutaneous, Daily, Marcus M Radha, DO    piperacillin-tazobactam (ZOSYN) 3.375 g in dextrose 5 % 100 mL IVPB extended infusion (mini-bag), 3.375 g, Intravenous, Q8H, Marcus M Radha, DO, Last Rate: 25 mL/hr at 10/24/20 1453, 3.375 g at 10/24/20 1453    vancomycin (VANCOCIN) 1250 mg in dextrose 5 % 250 mL IVPB, 1,250 mg, Intravenous, Q12H, Marcus M Radha, DO, Stopped at 10/24/20 1206    perflutren lipid microspheres (DEFINITY) injection 1.65 mg, 1.5 mL, Intravenous, ONCE PRN, Marcus KOKO CarterRadha, DO    vancomycin (VANCOCIN) intermittent dosing (placeholder), , Other, RX Placeholder, Genesis Kahn MD  Social History     Socioeconomic History    Marital status: Single     Spouse name: Not on file    Number of children: Not on file    Years of education: Not on file    Highest education level: Not on file   Occupational History    Not on file   Social Needs    Financial resource strain: Not on file    Food insecurity     Worry: Not on file     Inability: Not on file    Transportation needs     Medical: Not on file     Non-medical: Not on file   Tobacco Use    Smoking status: Current Every Day Smoker     Packs/day: 0.50    Smokeless tobacco: Never Used   Substance and Sexual Activity    Alcohol use: Never     Frequency: Never    Drug use: Yes     Types: Marijuana     Comment: occasional    Sexual activity: Not on file   Lifestyle    Physical activity     Days per week: Not on file     Minutes per session: Not on file    Stress: Not on file   Relationships    Social connections     Talks on phone: Not on file     Gets together: Not on file     Attends Je[employer]'s witness service: Not on file     Active member of club or organization: Not on file     Attends meetings of clubs or organizations: Not on file Relationship status: Not on file    Intimate partner violence     Fear of current or ex partner: Not on file     Emotionally abused: Not on file     Physically abused: Not on file     Forced sexual activity: Not on file   Other Topics Concern    Not on file   Social History Narrative    Not on file     No family history on file. ROS: Complete 10 point ROS was obtained with positives in HPI, otherwise negative. /75   Pulse 70   Temp 98.3 °F (36.8 °C) (Oral)   Resp 16   Ht 5' 4\" (1.626 m)   Wt 136 lb 11 oz (62 kg)   SpO2 96%   BMI 23.46 kg/m²     Physical Exam  General: Alert and oriented x3, no acute distress. HEENT: Pupils equal, round and reactive bilaterally, no cervical lymphadenopathy  Cardiovascular: Regular rate and rhythm, no carotid bruits, radial pulses present  Respiratory: Lungs clear to auscultation bilaterally  GI: Soft, non-tender, non-distended,   Musculoskeletal:    Lumbar spine: No midline tenderness to palpation, range of motion normal   LLE: No deformities. No edema. RLE: No deformities. No edema. Pelvis: Stable. No SI joint tenderness to palpation. Skin: No discoloration. Skin turgor normal.  Psychiatric: Mood is subdued, tearful at timesl. Demonstrates understanding of situation. Neurologic   LLE Motor: Strength is symmetric and 5/5 in the iliopsoas, quadriceps, tibialis anterior, extensor hallucis longus, gastrocnemius and hamstrings. RLE Motor: Strength is symmetric and 5/5 in the iliopsoas, quadriceps, tibialis anterior, extensor hallucis longus, gastrocnemius and hamstrings. Muscle Examination: Muscle bulk and tone are normal.  I see no fasciculations or atrophy. Gait and Station: Gets up under her own power and walks to the bathroom, but admits back pain. Sensation: Sensation is symmetric to touch and pinprick in the lower extremities bilaterally.    Reflexes:    LLE: patellar 2, ankle1   RLE: patellar 2, ankle 1   Pathologic: No Harden's, Clonus or Perezinski's    Imaging Studies: Outside MRI imaging not available for viewing in Epic. MRI lumbar spine with and without contrast:  There has been interval change in the appearance of the lumbosacral interspace since 2016. Suann Pillar is now near loss of disc material with prominent fatty endplate changes. Suann Pillar is a small amount of residual edema and enhancement at the endplates suggestive of some degree of persistent or recurrent infection.  Findings are mostly thought to be consistent with the sequela of prior discitis/osteomyelitis.  In the anterior spine paraspinal tissue, there is a probable 8 mm abscess cavity.  There is no evidence of posterior/epidural abscess. Suann Pillar is however some lateral recess and neural foraminal narrowing which is most pronounced in the left lateral recess where there is possible mass-effect upon the left S1 nerve root as previously described.  Relatively stable disc spondylosis L3 and L4 interspaces also seen.     Assessment:     Plan:

## 2020-10-24 NOTE — PROGRESS NOTES
[facility], [facility] [address]    Radiology:  No orders to display           Assessment/Plan:    Active Hospital Problems    Diagnosis    Spinal abscess ([facility] [address] [M46.20]     1. Spinal abscess, iv vancomycin and zosyn for now, pain management, NS and ID consulted, echo ordered. 2. Tobacco abuse, counseled.      Diet: Diet NPO, After Midnight  Code Status: Full Code        Abi Hayes MD

## 2020-10-24 NOTE — CONSULTS
Clinical Pharmacy Note  Vancomycin Consult    Eve Green is a 28 y.o. female ordered Vancomycin for spinal abscess; consult received from Dr. Kim Carraqsuillo to manage therapy. Also receiving Zosyn. Patient Active Problem List   Diagnosis    Discitis    Spinal abscess (HCC)       Allergies:  Fluconazole and Levofloxacin     Temp max:  Temp (24hrs), Av.3 °F (36.8 °C), Min:98.3 °F (36.8 °C), Max:98.3 °F (36.8 °C)      No results for input(s): WBC in the last 72 hours. No results for input(s): BUN, CREATININE in the last 72 hours. No intake or output data in the 24 hours ending 10/23/20 5446    Culture Results:  pending    Ht Readings from Last 1 Encounters:   10/23/20 5' 4\" (1.626 m)        Wt Readings from Last 1 Encounters:   10/23/20 136 lb 11 oz (62 kg)         CrCl cannot be calculated (Patient's most recent lab result is older than the maximum 10 days allowed. ). Assessment/Plan:  Serum creatinine from 10/23/20 at [facility] was 0.5 mg/dL. Patient received Vancomycin 1,500 mg IVPB x 1 on 10/23/20 at 1850 at [facility]. Will order Vancomycin 1,250 mg IVPB Q12H moving forward - next dose 10/24/20 at 0700. Will obtain a Vancomycin trough on 10/24/20 at 1800 to assist with subsequent dosing/management. Thank you for the consult. Will continue to follow.     Kiran Arevalo, PharmD, BCPS  10/23/2020 10:58 PM

## 2020-10-24 NOTE — PROGRESS NOTES
Patient A&O. Patient is currently NPO. Call light within reach. Will continue to monitor and reassess.  Electronically signed by Camilo Costa RN on 10/24/2020

## 2020-10-24 NOTE — PROGRESS NOTES
Patient is wanting different pain medications. Dr. Yumiko Rodney aware and notified. Dr. Yumiko Rodney stated that he would change the pain medications. See new orders. Will continue to monitor and reassess.  Electronically signed by Matt Nolan RN on 10/24/2020

## 2020-10-25 LAB
ANION GAP SERPL CALCULATED.3IONS-SCNC: 12 MMOL/L (ref 3–16)
BACTERIA WET PREP: NORMAL
BASOPHILS ABSOLUTE: 0.1 K/UL (ref 0–0.2)
BASOPHILS RELATIVE PERCENT: 0.8 %
BUN BLDV-MCNC: 19 MG/DL (ref 7–20)
C-REACTIVE PROTEIN: 1.8 MG/L (ref 0–5.1)
CALCIUM SERPL-MCNC: 8.4 MG/DL (ref 8.3–10.6)
CHLORIDE BLD-SCNC: 99 MMOL/L (ref 99–110)
CLUE CELLS: NORMAL
CO2: 22 MMOL/L (ref 21–32)
CREAT SERPL-MCNC: 0.7 MG/DL (ref 0.6–1.1)
EOSINOPHILS ABSOLUTE: 0.4 K/UL (ref 0–0.6)
EOSINOPHILS RELATIVE PERCENT: 3.4 %
EPITHELIAL CELLS WET PREP: NORMAL
GFR AFRICAN AMERICAN: >60
GFR NON-AFRICAN AMERICAN: >60
GLUCOSE BLD-MCNC: 71 MG/DL (ref 70–99)
HCT VFR BLD CALC: 37.1 % (ref 36–48)
HEMOGLOBIN: 12.1 G/DL (ref 12–16)
LYMPHOCYTES ABSOLUTE: 2.8 K/UL (ref 1–5.1)
LYMPHOCYTES RELATIVE PERCENT: 26.9 %
MAGNESIUM: 2.2 MG/DL (ref 1.8–2.4)
MCH RBC QN AUTO: 27.6 PG (ref 26–34)
MCHC RBC AUTO-ENTMCNC: 32.5 G/DL (ref 31–36)
MCV RBC AUTO: 85 FL (ref 80–100)
MONOCYTES ABSOLUTE: 0.8 K/UL (ref 0–1.3)
MONOCYTES RELATIVE PERCENT: 7.5 %
NEUTROPHILS ABSOLUTE: 6.5 K/UL (ref 1.7–7.7)
NEUTROPHILS RELATIVE PERCENT: 61.4 %
PDW BLD-RTO: 15 % (ref 12.4–15.4)
PLATELET # BLD: 212 K/UL (ref 135–450)
PMV BLD AUTO: 8.3 FL (ref 5–10.5)
POTASSIUM SERPL-SCNC: 4.6 MMOL/L (ref 3.5–5.1)
RBC # BLD: 4.36 M/UL (ref 4–5.2)
RBC WET PREP: NORMAL
REASON FOR REJECTION: NORMAL
REJECTED TEST: NORMAL
SEDIMENTATION RATE, ERYTHROCYTE: 10 MM/HR (ref 0–20)
SODIUM BLD-SCNC: 133 MMOL/L (ref 136–145)
SOURCE WET PREP: NORMAL
TRICHOMONAS PREP: NORMAL
WBC # BLD: 10.6 K/UL (ref 4–11)
WBC WET PREP: NORMAL
YEAST WET PREP: NORMAL

## 2020-10-25 PROCEDURE — 83735 ASSAY OF MAGNESIUM: CPT

## 2020-10-25 PROCEDURE — 6370000000 HC RX 637 (ALT 250 FOR IP): Performed by: NURSE PRACTITIONER

## 2020-10-25 PROCEDURE — 6370000000 HC RX 637 (ALT 250 FOR IP): Performed by: INTERNAL MEDICINE

## 2020-10-25 PROCEDURE — 80048 BASIC METABOLIC PNL TOTAL CA: CPT

## 2020-10-25 PROCEDURE — 86140 C-REACTIVE PROTEIN: CPT

## 2020-10-25 PROCEDURE — 87591 N.GONORRHOEAE DNA AMP PROB: CPT

## 2020-10-25 PROCEDURE — 1200000000 HC SEMI PRIVATE

## 2020-10-25 PROCEDURE — 36415 COLL VENOUS BLD VENIPUNCTURE: CPT

## 2020-10-25 PROCEDURE — 85025 COMPLETE CBC W/AUTO DIFF WBC: CPT

## 2020-10-25 PROCEDURE — 94760 N-INVAS EAR/PLS OXIMETRY 1: CPT

## 2020-10-25 PROCEDURE — 87210 SMEAR WET MOUNT SALINE/INK: CPT

## 2020-10-25 PROCEDURE — 85652 RBC SED RATE AUTOMATED: CPT

## 2020-10-25 PROCEDURE — 87491 CHLMYD TRACH DNA AMP PROBE: CPT

## 2020-10-25 RX ORDER — IBUPROFEN 400 MG/1
800 TABLET ORAL EVERY 8 HOURS PRN
Status: DISCONTINUED | OUTPATIENT
Start: 2020-10-25 | End: 2020-10-27

## 2020-10-25 RX ORDER — METHOCARBAMOL 500 MG/1
500 TABLET, FILM COATED ORAL 3 TIMES DAILY PRN
Status: DISCONTINUED | OUTPATIENT
Start: 2020-10-25 | End: 2020-10-27 | Stop reason: SDUPTHER

## 2020-10-25 RX ORDER — IBUPROFEN 400 MG/1
400 TABLET ORAL EVERY 8 HOURS PRN
Status: DISCONTINUED | OUTPATIENT
Start: 2020-10-25 | End: 2020-10-25

## 2020-10-25 RX ORDER — BUTALBITAL, ACETAMINOPHEN AND CAFFEINE 50; 325; 40 MG/1; MG/1; MG/1
1 TABLET ORAL ONCE
Status: COMPLETED | OUTPATIENT
Start: 2020-10-25 | End: 2020-10-25

## 2020-10-25 RX ORDER — OXYCODONE HYDROCHLORIDE 10 MG/1
10 TABLET ORAL EVERY 4 HOURS PRN
Status: DISPENSED | OUTPATIENT
Start: 2020-10-25 | End: 2020-10-26

## 2020-10-25 RX ORDER — ACETAMINOPHEN 500 MG
1000 TABLET ORAL EVERY 8 HOURS PRN
Status: DISCONTINUED | OUTPATIENT
Start: 2020-10-25 | End: 2020-10-27 | Stop reason: HOSPADM

## 2020-10-25 RX ADMIN — OXYCODONE HYDROCHLORIDE 10 MG: 10 TABLET ORAL at 19:52

## 2020-10-25 RX ADMIN — OXYCODONE HYDROCHLORIDE 10 MG: 10 TABLET ORAL at 15:01

## 2020-10-25 RX ADMIN — METOPROLOL TARTRATE 25 MG: 25 TABLET, FILM COATED ORAL at 19:53

## 2020-10-25 RX ADMIN — METOPROLOL TARTRATE 25 MG: 25 TABLET, FILM COATED ORAL at 09:09

## 2020-10-25 RX ADMIN — OXYCODONE HYDROCHLORIDE 10 MG: 10 TABLET ORAL at 23:59

## 2020-10-25 RX ADMIN — BUTALBITAL, ACETAMINOPHEN, AND CAFFEINE 1 TABLET: 50; 325; 40 TABLET ORAL at 15:52

## 2020-10-25 RX ADMIN — OXYCODONE HYDROCHLORIDE 10 MG: 10 TABLET ORAL at 10:58

## 2020-10-25 RX ADMIN — IBUPROFEN 400 MG: 400 TABLET, FILM COATED ORAL at 01:02

## 2020-10-25 RX ADMIN — IBUPROFEN 400 MG: 400 TABLET, FILM COATED ORAL at 09:08

## 2020-10-25 RX ADMIN — ACETAMINOPHEN 1000 MG: 500 TABLET ORAL at 04:20

## 2020-10-25 RX ADMIN — OXYCODONE HYDROCHLORIDE 10 MG: 10 TABLET ORAL at 06:33

## 2020-10-25 RX ADMIN — METHOCARBAMOL TABLETS 500 MG: 500 TABLET, COATED ORAL at 22:41

## 2020-10-25 RX ADMIN — IBUPROFEN 800 MG: 400 TABLET ORAL at 19:53

## 2020-10-25 RX ADMIN — OXYCODONE HYDROCHLORIDE 10 MG: 10 TABLET ORAL at 02:30

## 2020-10-25 ASSESSMENT — PAIN DESCRIPTION - LOCATION
LOCATION: BACK
LOCATION: HEAD
LOCATION: BACK
LOCATION: BACK

## 2020-10-25 ASSESSMENT — PAIN SCALES - GENERAL
PAINLEVEL_OUTOF10: 7
PAINLEVEL_OUTOF10: 6
PAINLEVEL_OUTOF10: 8
PAINLEVEL_OUTOF10: 7
PAINLEVEL_OUTOF10: 7
PAINLEVEL_OUTOF10: 8
PAINLEVEL_OUTOF10: 9
PAINLEVEL_OUTOF10: 5
PAINLEVEL_OUTOF10: 6
PAINLEVEL_OUTOF10: 6
PAINLEVEL_OUTOF10: 4
PAINLEVEL_OUTOF10: 7
PAINLEVEL_OUTOF10: 7
PAINLEVEL_OUTOF10: 8
PAINLEVEL_OUTOF10: 5
PAINLEVEL_OUTOF10: 8

## 2020-10-25 ASSESSMENT — PAIN DESCRIPTION - DESCRIPTORS
DESCRIPTORS: ACHING

## 2020-10-25 ASSESSMENT — PAIN DESCRIPTION - PAIN TYPE
TYPE: ACUTE PAIN

## 2020-10-25 ASSESSMENT — PAIN DESCRIPTION - ORIENTATION
ORIENTATION: LOWER

## 2020-10-25 ASSESSMENT — PAIN DESCRIPTION - FREQUENCY
FREQUENCY: CONTINUOUS

## 2020-10-25 ASSESSMENT — PAIN DESCRIPTION - PROGRESSION
CLINICAL_PROGRESSION: NOT CHANGED
CLINICAL_PROGRESSION: GRADUALLY IMPROVING
CLINICAL_PROGRESSION: NOT CHANGED

## 2020-10-25 ASSESSMENT — PAIN SCALES - WONG BAKER
WONGBAKER_NUMERICALRESPONSE: 0
WONGBAKER_NUMERICALRESPONSE: 0

## 2020-10-25 ASSESSMENT — PAIN DESCRIPTION - ONSET
ONSET: ON-GOING

## 2020-10-25 ASSESSMENT — PAIN - FUNCTIONAL ASSESSMENT: PAIN_FUNCTIONAL_ASSESSMENT: ACTIVITIES ARE NOT PREVENTED
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
## 2020-10-25 NOTE — CONSULTS
OF SYSTEMS:    No fever / chills / sweats. No weight loss. No visual change, eye pain, eye discharge. No oral lesion, sore throat, dysphagia. Denies cough / sputum. Denies chest pain, palpitations. Denies n / v / abd pain. No diarrhea. Denies dysuria or change in urinary function. Denies joint swelling or pain. No myalgia, arthralgia. Denies skin changes, itching  Denies focal weakness, sensory change or other neurologic symptom    Denies new / worse depression, psychiatric symptoms    PHYSICAL EXAM:      Vitals:    /65   Pulse 109   Temp 98.3 °F (36.8 °C) (Oral)   Resp 15   Ht 5' 4\" (1.626 m)   Wt 136 lb 11 oz (62 kg)   SpO2 95%   BMI 23.46 kg/m²     GENERAL: No apparent distress.   RA  HEENT: Membranes moist, no oral lesion, PERRL  NECK:  Supple, no lymphadenopaty  LUNGS: Clear b/l, no rales, no dullness  CARDIAC: RRR, no murmur appreciated  ABD:  + BS, soft / NT  EXT:  No rash, no edema, no lesions  NEURO: No focal neurologic findings  PSYCH: Orientation, sensorium, mood normal  LINES:  Peripheral iv  BACK - tender with palpation over lower lumbar spine in midline    DATA:    Lab Results   Component Value Date    WBC 11.6 (H) 10/24/2020    HGB 12.0 10/24/2020    HCT 36.5 10/24/2020    MCV 83.0 10/24/2020     10/24/2020     Lab Results   Component Value Date    CREATININE 0.7 10/24/2020    BUN 15 10/24/2020     10/24/2020    K 4.2 10/24/2020     10/24/2020    CO2 25 10/24/2020       Hepatic Function Panel:   Lab Results   Component Value Date    ALKPHOS 97 07/01/2020    ALT 11 07/01/2020    AST 13 07/01/2020    PROT 7.9 07/01/2020    BILITOT <0.2 07/01/2020    LABALBU 3.5 07/01/2020       Micro:  10/23 BC x 2 sent  10/23 BC x 2 sent from [facility]    7/1 BC no growth  7/1 IR biopsy: + E cloacae  Enterobacter cloacae complex   Antibiotic  Interpretation  IDALIA  Status     amoxicillin-clavulanate  Resistant  >16/8  mcg/mL      ampicillin  Resistant  >16  mcg/mL ceFAZolin  Resistant  >16  mcg/mL      cefepime  Sensitive  <=2  mcg/mL      cefTRIAXone  Sensitive  <=1  mcg/mL      cefuroxime  Intermediate  16  mcg/mL      ciprofloxacin  Sensitive  <=1  mcg/mL      ertapenem  Sensitive  <=0.5  mcg/mL      gentamicin  Sensitive  <=4  mcg/mL      meropenem  Sensitive  <=1  mcg/mL      piperacillin-tazobactam  Sensitive  <=16  mcg/mL      trimethoprim-sulfamethoxazole  Sensitive  <=2/38  mcg/mL         Imaging:   10/22 MRI at [facility]  MRI lumbar spine with and without contrast:  There has been interval change in the appearance of the lumbosacral interspace since 2016. Richie Alvarez is now near loss of disc material with prominent fatty endplate changes. [place] Gentleman is a small amount of residual edema and enhancement at the endplates suggestive of some degree of persistent or recurrent infection.  Findings are mostly thought to be consistent with the sequela of prior discitis/osteomyelitis.  In the anterior spine paraspinal tissue, there is a probable 8 mm abscess cavity.  There is no evidence of posterior/epidural abscess. Richie Alvarez is however some lateral recess and neural foraminal narrowing which is most pronounced in the left lateral recess where there is possible mass-effect upon the left S1 nerve root as previously described.  Relatively stable disc spondylosis L3 and L4 interspaces also seen. ECHO:  10.24 TTE  Summary    Normal left ventricle size, wall thickness, and systolic function with an    estimated ejection fraction of 55-60%.  No regional wall motion abnormalities are seen.    Normal diastolic function.    Mild thickening of anterior leaflet of mitral valve.    Trivial mitral regurgitation is present.    No evidence of mitral valve stenosis.    No vegetation or masses are seen on the mitral valve.    Normal right ventricular size and function.    TAPSE 1.8 cm    No valvular vegetations noted on this study.         IMPRESSION:      Patient Active Problem List Diagnosis    Discitis    Spinal abscess ([facility] [address]       IVDU, no use recently (has been incarcerated)  HCV positive (HCV VL not detected 7/8/20 from [address] [place] [address])    Hx L5/S1 infection   - admit 7/1 - 7/5, biopsy cult + E cloacae, left AMA (not treated)    LBP - better then worse  New MRI from [facility] abnormal though not clear what is new, per report - \"Findings are mostly thought to be consistent with the sequela of prior discitis/osteomyelitis. \", \"anterior paraspinal probable abscess\"    Mild leukocytosis    Allergy - Levofloxacin, hives, itching    RECOMMENDATIONS:    Hold antibiotics  Check ESR / CRP  F/u on BC - drawn at [facility] and [facility]  Review MRI and compare to previous (need to have MRI from [facility] transferred digitally to [facility])  May need perc biopsy to obtain organism    [facility] phone # [phone]  Discussed with pt, RN  Buster Barber MD

## 2020-10-25 NOTE — PROGRESS NOTES
[address] Day: 3   Diagnosis: Lumbar spinal abscess, history of IV drug use. /74   Pulse 88   Temp 97.6 °F (36.4 °C) (Oral)   Resp 16   Ht 5' 4\" (1.626 m)   Wt 139 lb 12.4 oz (63.4 kg)   SpO2 98%   BMI 23.99 kg/m²     Afebrile. Patient seen up and walking the halls independently. Denies feeling better. Complaining of lower lumbar and sacral pain. No radiating pain and no saddle anesthesia. Antibiotics were reportedly stopped by ID in preparation for aspiration tomorrow. Pain managed on current medications, oxycodone 10 mg Q 4hrs. Back pain rated 8/10. Not satisfied with pain control. Neurological Exam:   Motor strength and sensation intact and symmetric. No saddle anesthesia. Gets up under her own power. Labs reviewed: CBC normal, sed rate 10 (normal), Blood cultures 10/23 normal to date. Med list and MAR reviewed. Nonsurgical.   ID following. Neurologically intact. Will sign off. please call if there is a decline in neurologic function.

## 2020-10-25 NOTE — CARE COORDINATION
INITIAL CASE MANAGEMENT ASSESSMENT    Reviewed chart, met with patient to assess possible discharge needs. Explained Case Management role/services. Living Situation: Patient lives in [place]. She was recently in shelter in [facility] and sent to [facility] and transferred to [facility]. Prior to going to shelter she was living with her [address] in an apartment where she is able to return. ADLs: Independent     DME: None    PT/OT Recs: Not assessed     Active Services: None     Transportation: Active /Family can transport     Medications: [facility] [place], [place]/No barriers/Has [place] Medicaid    PCP: Dr. Newton Olivera      HD/PD: N/A    PLAN/COMMENTS: Patient would like to return to home however if she needs long term IV antibiotics she is willing to go to [facility]. SW/CM provided contact information for patient or family to call with any questions. SW/CM will follow and assist as needed. Electronically signed by Carlos Oconnell RN on 10/25/2020 at 2:51 PM

## 2020-10-25 NOTE — PROGRESS NOTES
Pt c/o muscle spasms in back and requesting that muscle relaxer be ordered.  Dr. Shanti Youngblood made aware with response to pass on to day shift team.     Electronically signed by Art Bhatti RN on 81/21/8015 at 6:04 AM

## 2020-10-25 NOTE — PROGRESS NOTES
Patient took out her IV when ID doctor put all her antibiotics on hold. Patient is also requesting a dosage change for her ibuprofen. Roger Guevara NP aware and notified. Ok to leave IV out per S. TK Chance. See new ibuprofen order. Will continue to monitor and reassess.  Electronically signed by Mike Pink RN on 10/25/2020

## 2020-10-25 NOTE — PLAN OF CARE
Problem: Pain:  Goal: Pain level will decrease  Description: Pain level will decrease  94/83/4508 2016 by Adelaide Frazier RN  Outcome: Ongoing  10/24/2020 1225 by Taras Esteves RN  Outcome: Ongoing  Goal: Control of acute pain  Description: Control of acute pain  32/05/5800 6702 by Adelaide Frazier RN  Outcome: Ongoing  10/24/2020 1225 by Taras Esteves RN  Outcome: Ongoing  Goal: Control of chronic pain  Description: Control of chronic pain  05/38/5231 0983 by Adelaide Frazier RN  Outcome: Ongoing  10/24/2020 1225 by Taras Esteves RN  Outcome: Ongoing

## 2020-10-25 NOTE — PROGRESS NOTES
Patient requested to speak with  for resources. Consult placed. This nurse spoke with Baljit Galaviz, Robert Good on-call, regarding the patient. Baljit Galaviz stated that she would call the patient's room. Will continue to monitor and reassess.  Electronically signed by Matt Nolan RN on 10/25/2020

## 2020-10-25 NOTE — PROGRESS NOTES
Patient is requesting fioricet for her head. Patient states she has migraines. Jadon Burns NP, aware and notified. See new order. Will continue to monitor and reassess.  Electronically signed by Ambar Nichols RN on 10/25/2020
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
## 2020-10-25 NOTE — PROGRESS NOTES
Hospitalist Progress Note      PCP: No primary care provider on file. Date of Admission: 10/23/2020      Subjective: feels ok, still having pain in her back, seems irritated states she is probably going on leave AMA since no one is doing anything for her pain. , denies fever, chills or diarrhea. Patient is receiving 800 mg of Motrin every 8 as needed and 10 mg of Oxycodon IR every 4 as needed for pain    Patient was admitted from [facility], she is threatening to leave AMA, we will call and find out whether she is to be sent back to [facility] or if she can leave on her own. Medications:  Reviewed    Infusion Medications     Scheduled Medications    metoprolol tartrate  25 mg Oral BID    sodium chloride flush  10 mL Intravenous 2 times per day    enoxaparin  40 mg Subcutaneous Daily     PRN Meds: acetaminophen, ibuprofen, oxyCODONE, sodium chloride flush, perflutren lipid microspheres      Intake/Output Summary (Last 24 hours) at 10/25/2020 0747  Last data filed at 10/25/2020 0120  Gross per 24 hour   Intake 860 ml   Output --   Net 860 ml       Physical Exam Performed:    /65   Pulse 109   Temp 98.3 °F (36.8 °C) (Oral)   Resp 15   Ht 5' 4\" (1.626 m)   Wt 139 lb 12.4 oz (63.4 kg)   SpO2 95%   BMI 23.99 kg/m²     General appearance: No apparent distress, agitated  Neck: Supple  Respiratory:  Normal respiratory effort. Clear to auscultation, bilaterally without Rales/Wheezes/Rhonchi. Cardiovascular: Regular rate and rhythm with normal S1/S2 without murmurs, rubs or gallops. Musculoskeletal: No clubbing, tenderness to the bilateral lower paraspinal musculature and lumbar spine  Skin: Skin color, texture, turgor normal.  No rashes or lesions.   Neurologic:  Moving all extremities   Psychiatric: Alert and oriented  Capillary Refill: Brisk,< 3 seconds   Peripheral Pulses: +2 palpable, equal bilaterally       Labs:   Recent Labs     10/24/20  0532 10/25/20  0646   WBC 11.6* 10.6   HGB 12.0 12.1   HCT 36.5 37.1    212     Recent Labs     10/24/20  0532      K 4.2      CO2 25   BUN 15   CREATININE 0.7   CALCIUM 8.8     No results for input(s): AST, ALT, BILIDIR, BILITOT, ALKPHOS in the last 72 hours. No results for input(s): INR in the last 72 hours. No results for input(s): Portuguese Maurer in the last 72 hours. Urinalysis:    No results found for: Lenord Laws, BACTERIA, RBCUA, BLOODU, [address], [address]    Radiology:  No orders to display           Assessment/Plan:    Active Hospital Problems    Diagnosis    IVDU (intravenous drug user) [F19.90]    Chronic midline low back pain without sciatica [M54.5, G89.29]    Lower back pain [M54.5]    Osteomyelitis of vertebra, lumbosacral region [facility]) [M46.27]    Spinal abscess ([facility] [place] [facility] [address] [M46.20]     Spinal abscess  Secondary to IV drug abuse  On chart review the patient was seen in June and July 2020 and left both times AMA, she has had 2 episodes of incomplete treatment   -iv vancomycin and zosyn on hold as of 10/24 per ID waiting for blood cultures  -pain management-ibuprofen 800 every 8/Oxy IR 10 mg every 4 as needed  - NS-nonsurgical  - ID consulted-on antibiotics till blood cultures result  -echo ordered-EF 55 to 60%, no vegetation seen     Tobacco abuse   counseled.      Diet: DIET GENERAL;  Code Status: Full Code    Dispo-inpatient    ONEIL Bray - CNP

## 2020-10-25 NOTE — PROGRESS NOTES
Patient A&O. Tolerating PO. Call light within reach. Will continue to monitor and reassess.  Electronically signed by Sharan Schneider RN on 10/25/2020

## 2020-10-25 NOTE — PROGRESS NOTES
Jennifer Lyle NP requesting results of blood cultures from [facility] in [place]. This nurse called [facility] and spoke to Jodi Dumas from microbiology. Two bottles were obtained on 10/23/20. As of today, there is no growth to date per Jodi Dumas from microbiology. Jennifer Lyle NP, aware and notified. Will continue to monitor and reassess.  Electronically signed by Elvan Landau, RN on 10/25/2020

## 2020-10-25 NOTE — PROGRESS NOTES
Patient's oxycodone order d/c'd.  Lina Garza NP, aware and notified. Ok to reinstate the order per S. TK Means. See new order. Will continue to monitor and reassess.  Electronically signed by Cherylene Salen, RN on 10/25/2020

## 2020-10-25 NOTE — CONSULTS
Clinical Pharmacy Note  Vancomycin Consult    Adriana Donaldson is a 28 y.o. female ordered Vancomycin for spinal abscess; consult received from Dr. Bambi Ryan to manage therapy. Also receiving Zosyn. Patient Active Problem List   Diagnosis    Discitis    Spinal abscess (HCC)       Allergies:  Fluconazole and Levofloxacin     Temp max:  Temp (24hrs), Av.3 °F (36.8 °C), Min:98.3 °F (36.8 °C), Max:98.3 °F (36.8 °C)      Recent Labs     10/24/20  0532   WBC 11.6*       Recent Labs     10/24/20  0532   BUN 15   CREATININE 0.7       No intake or output data in the 24 hours ending 10/24/20 2020    Culture Results:  pending    Ht Readings from Last 1 Encounters:   10/23/20 5' 4\" (1.626 m)        Wt Readings from Last 1 Encounters:   10/23/20 136 lb 11 oz (62 kg)         Estimated Creatinine Clearance: 97 mL/min (based on SCr of 0.7 mg/dL). Assessment/Plan:  Serum creatinine from 10/23/20 at [facility] was 0.5 mg/dL. Patient received Vancomycin 1,500 mg IVPB x 1 on 10/23/20 at 1850 at [facility]. Vancomycin 1,250 mg IVPB Q12H ordered on arrival to [facility] 139 after first 1250 mg dose = 10.7 ug/ml   Continue vancomycin 1250 mg every 12 hours. Will obtain another trough on 10/25/20 at 2000. Thank you for the consult. Will continue to follow.     Mark Flores RPh  10/24/2020 8:20 PM

## 2020-10-26 LAB
ANION GAP SERPL CALCULATED.3IONS-SCNC: 10 MMOL/L (ref 3–16)
BASOPHILS ABSOLUTE: 0 K/UL (ref 0–0.2)
BASOPHILS RELATIVE PERCENT: 0.3 %
BUN BLDV-MCNC: 16 MG/DL (ref 7–20)
C TRACH DNA GENITAL QL NAA+PROBE: NEGATIVE
CALCIUM SERPL-MCNC: 9.5 MG/DL (ref 8.3–10.6)
CHLORIDE BLD-SCNC: 99 MMOL/L (ref 99–110)
CO2: 28 MMOL/L (ref 21–32)
CREAT SERPL-MCNC: 0.5 MG/DL (ref 0.6–1.1)
EOSINOPHILS ABSOLUTE: 0.3 K/UL (ref 0–0.6)
EOSINOPHILS RELATIVE PERCENT: 4.5 %
GFR AFRICAN AMERICAN: >60
GFR NON-AFRICAN AMERICAN: >60
GLUCOSE BLD-MCNC: 82 MG/DL (ref 70–99)
HCT VFR BLD CALC: 39.7 % (ref 36–48)
HEMOGLOBIN: 13.1 G/DL (ref 12–16)
LYMPHOCYTES ABSOLUTE: 2.5 K/UL (ref 1–5.1)
LYMPHOCYTES RELATIVE PERCENT: 38.4 %
MAGNESIUM: 1.9 MG/DL (ref 1.8–2.4)
MCH RBC QN AUTO: 27.7 PG (ref 26–34)
MCHC RBC AUTO-ENTMCNC: 33.1 G/DL (ref 31–36)
MCV RBC AUTO: 83.6 FL (ref 80–100)
MONOCYTES ABSOLUTE: 0.5 K/UL (ref 0–1.3)
MONOCYTES RELATIVE PERCENT: 8.2 %
N. GONORRHOEAE DNA: NEGATIVE
NEUTROPHILS ABSOLUTE: 3.2 K/UL (ref 1.7–7.7)
NEUTROPHILS RELATIVE PERCENT: 48.6 %
PDW BLD-RTO: 14.9 % (ref 12.4–15.4)
PLATELET # BLD: 228 K/UL (ref 135–450)
PMV BLD AUTO: 9.1 FL (ref 5–10.5)
POTASSIUM SERPL-SCNC: 4.5 MMOL/L (ref 3.5–5.1)
RBC # BLD: 4.75 M/UL (ref 4–5.2)
SODIUM BLD-SCNC: 137 MMOL/L (ref 136–145)
WBC # BLD: 6.6 K/UL (ref 4–11)

## 2020-10-26 PROCEDURE — 85025 COMPLETE CBC W/AUTO DIFF WBC: CPT

## 2020-10-26 PROCEDURE — 99232 SBSQ HOSP IP/OBS MODERATE 35: CPT | Performed by: INTERNAL MEDICINE

## 2020-10-26 PROCEDURE — 80048 BASIC METABOLIC PNL TOTAL CA: CPT

## 2020-10-26 PROCEDURE — 83735 ASSAY OF MAGNESIUM: CPT

## 2020-10-26 PROCEDURE — 2580000003 HC RX 258: Performed by: INTERNAL MEDICINE

## 2020-10-26 PROCEDURE — 36415 COLL VENOUS BLD VENIPUNCTURE: CPT

## 2020-10-26 PROCEDURE — 94760 N-INVAS EAR/PLS OXIMETRY 1: CPT

## 2020-10-26 PROCEDURE — 1200000000 HC SEMI PRIVATE

## 2020-10-26 PROCEDURE — 6370000000 HC RX 637 (ALT 250 FOR IP): Performed by: NURSE PRACTITIONER

## 2020-10-26 PROCEDURE — 6360000002 HC RX W HCPCS: Performed by: INTERNAL MEDICINE

## 2020-10-26 PROCEDURE — 6370000000 HC RX 637 (ALT 250 FOR IP): Performed by: INTERNAL MEDICINE

## 2020-10-26 RX ORDER — BUTALBITAL, ACETAMINOPHEN AND CAFFEINE 50; 325; 40 MG/1; MG/1; MG/1
1 TABLET ORAL EVERY 4 HOURS PRN
Status: DISCONTINUED | OUTPATIENT
Start: 2020-10-26 | End: 2020-10-27 | Stop reason: HOSPADM

## 2020-10-26 RX ORDER — OXYCODONE HYDROCHLORIDE 10 MG/1
10 TABLET ORAL EVERY 4 HOURS PRN
Status: DISCONTINUED | OUTPATIENT
Start: 2020-10-26 | End: 2020-10-27 | Stop reason: HOSPADM

## 2020-10-26 RX ADMIN — METOPROLOL TARTRATE 25 MG: 25 TABLET, FILM COATED ORAL at 09:01

## 2020-10-26 RX ADMIN — OXYCODONE HYDROCHLORIDE 10 MG: 10 TABLET ORAL at 09:14

## 2020-10-26 RX ADMIN — IBUPROFEN 800 MG: 400 TABLET ORAL at 05:14

## 2020-10-26 RX ADMIN — BUTALBITAL, ACETAMINOPHEN AND CAFFEINE 1 TABLET: 50; 325; 40 TABLET ORAL at 10:43

## 2020-10-26 RX ADMIN — OXYCODONE HYDROCHLORIDE 10 MG: 10 TABLET ORAL at 05:14

## 2020-10-26 RX ADMIN — METOPROLOL TARTRATE 25 MG: 25 TABLET, FILM COATED ORAL at 20:33

## 2020-10-26 RX ADMIN — BUTALBITAL, ACETAMINOPHEN AND CAFFEINE 1 TABLET: 50; 325; 40 TABLET ORAL at 20:33

## 2020-10-26 RX ADMIN — OXYCODONE HYDROCHLORIDE 10 MG: 10 TABLET ORAL at 22:08

## 2020-10-26 RX ADMIN — METHOCARBAMOL TABLETS 500 MG: 500 TABLET, COATED ORAL at 22:08

## 2020-10-26 RX ADMIN — METHOCARBAMOL TABLETS 500 MG: 500 TABLET, COATED ORAL at 07:20

## 2020-10-26 RX ADMIN — OXYCODONE HYDROCHLORIDE 10 MG: 10 TABLET ORAL at 17:39

## 2020-10-26 RX ADMIN — METHOCARBAMOL TABLETS 500 MG: 500 TABLET, COATED ORAL at 16:58

## 2020-10-26 RX ADMIN — CEFEPIME HYDROCHLORIDE 2 G: 2 INJECTION, POWDER, FOR SOLUTION INTRAVENOUS at 16:22

## 2020-10-26 RX ADMIN — IBUPROFEN 800 MG: 400 TABLET ORAL at 16:58

## 2020-10-26 RX ADMIN — OXYCODONE HYDROCHLORIDE 10 MG: 10 TABLET ORAL at 13:26

## 2020-10-26 ASSESSMENT — PAIN DESCRIPTION - FREQUENCY
FREQUENCY: CONTINUOUS

## 2020-10-26 ASSESSMENT — PAIN DESCRIPTION - ORIENTATION
ORIENTATION: MID;RIGHT;LEFT
ORIENTATION: MID;RIGHT;LEFT
ORIENTATION: LOWER
ORIENTATION: MID
ORIENTATION: LOWER
ORIENTATION: MID;RIGHT;LEFT
ORIENTATION: MID
ORIENTATION: MID
ORIENTATION: LOWER
ORIENTATION: MID;RIGHT;LEFT
ORIENTATION: MID

## 2020-10-26 ASSESSMENT — PAIN - FUNCTIONAL ASSESSMENT
PAIN_FUNCTIONAL_ASSESSMENT: ACTIVITIES ARE NOT PREVENTED

## 2020-10-26 ASSESSMENT — PAIN DESCRIPTION - ONSET
ONSET: ON-GOING

## 2020-10-26 ASSESSMENT — PAIN DESCRIPTION - PAIN TYPE
TYPE: ACUTE PAIN

## 2020-10-26 ASSESSMENT — PAIN SCALES - GENERAL
PAINLEVEL_OUTOF10: 8
PAINLEVEL_OUTOF10: 9
PAINLEVEL_OUTOF10: 8
PAINLEVEL_OUTOF10: 9
PAINLEVEL_OUTOF10: 8
PAINLEVEL_OUTOF10: 9
PAINLEVEL_OUTOF10: 7
PAINLEVEL_OUTOF10: 7
PAINLEVEL_OUTOF10: 8
PAINLEVEL_OUTOF10: 6
PAINLEVEL_OUTOF10: 8
PAINLEVEL_OUTOF10: 7

## 2020-10-26 ASSESSMENT — PAIN DESCRIPTION - LOCATION
LOCATION: BACK
LOCATION: HEAD
LOCATION: HEAD
LOCATION: BACK
LOCATION: BACK
LOCATION: HEAD
LOCATION: BACK
LOCATION: HEAD

## 2020-10-26 ASSESSMENT — PAIN DESCRIPTION - DESCRIPTORS
DESCRIPTORS: ACHING;DISCOMFORT
DESCRIPTORS: HEADACHE
DESCRIPTORS: HEADACHE
DESCRIPTORS: ACHING
DESCRIPTORS: ACHING;DISCOMFORT
DESCRIPTORS: HEADACHE
DESCRIPTORS: ACHING;DISCOMFORT
DESCRIPTORS: HEADACHE
DESCRIPTORS: ACHING;DISCOMFORT
DESCRIPTORS: ACHING;DISCOMFORT
DESCRIPTORS: ACHING
DESCRIPTORS: ACHING;DISCOMFORT
DESCRIPTORS: ACHING

## 2020-10-26 ASSESSMENT — PAIN SCALES - WONG BAKER
WONGBAKER_NUMERICALRESPONSE: 0

## 2020-10-26 ASSESSMENT — PAIN DESCRIPTION - PROGRESSION
CLINICAL_PROGRESSION: NOT CHANGED

## 2020-10-26 NOTE — PROGRESS NOTES
Pt resting quietly in bed with eyes closed, resp easy and unlabored. PRN pain medication administered per pt request with good effect. Appears comfortable. Call light is within reach. Will continue to monitor.      Electronically signed by Toni Schwartz RN on 39/25/3813 at 6:00 AM

## 2020-10-26 NOTE — PROGRESS NOTES
This RN called [facility] radiology per Dr. Kyla Cabot, MD request to obtain MRI results from [facility] to compare results of MRI from July 2020. Spoke with Violeta Monroy and there are no further questions at this time. Will continue to monitor per unit protocols.  Electronically signed by Deni Hodges RN on 10/26/2020 at 3:44 PM

## 2020-10-26 NOTE — PROGRESS NOTES
Infectious Disease Follow up Notes  Admit Date: 10/23/2020  Hospital Day: 4    Antibiotics :   IV Cefepime     CHIEF COMPLAINT:      Lumbar diskitis   Enterobacter infection   IVDA    Subjective interval History :  28 y.o. Woman h/o Lumbar diskitis and osteomyelitis, from IVDA and aspiration of disk +v for Enterobacter and she left AMA from hospital in July and was incarcerated now presented to [facility] for elevated wbc and some back pain and was tx to [facility] for further evaluation she had MRI L spine there and report reviewed    She is c/o some back pain requesting pain meds and she has no fevers no chills and WBC counts normalized, ESR, CRP not much elevation. She has no diarrhea no focal neurological symptoms. TTE done negative. She has been threatening to leave AMA per RN     Past Medical History:    Past Medical History:   Diagnosis Date    Herniated lumbar intervertebral disc        Past Surgical History:    Past Surgical History:   Procedure Laterality Date    CARPAL TUNNEL RELEASE Bilateral      SECTION      CHOLECYSTECTOMY         Current Medications:    Outpatient Medications Marked as Taking for the 10/23/20 encounter [facility] Encounter)   Medication Sig Dispense Refill    metoprolol tartrate (LOPRESSOR) 25 MG tablet Take 25 mg by mouth 2 times daily         Allergies:  Fluconazole and Levofloxacin    Immunizations : There is no immunization history on file for this patient.     Social History:    Social History     Tobacco Use    Smoking status: Current Every Day Smoker     Packs/day: 0.50    Smokeless tobacco: Never Used   Substance Use Topics    Alcohol use: Never     Frequency: Never    Drug use: Yes     Types: Marijuana     Comment: occasional     Social History     Tobacco Use   Smoking Status Current Every Day Smoker    Packs/day: 0.50   Smokeless Tobacco Never Used      Family History : no DVT no COPD    REVIEW OF SYSTEMS:     Constitutional:  negative for fevers, chills, night sweats  Eyes:  negative for blurred vision, eye discharge, visual disturbance   HEENT:  negative for hearing loss, ear drainage,nasal congestion  Respiratory:  negative for cough, shortness of breath or hemoptysis   Cardiovascular:  negative for chest pain, palpitations, syncope  Gastrointestinal:  negative for nausea, vomiting, diarrhea, constipation, abdominal pain  Genitourinary:  negative for frequency, dysuria, urinary incontinence, hematuria  Hematologic/Lymphatic:  negative for easy bruising, bleeding and lymphadenopathy  Allergic/Immunologic:  negative for recurrent infections, angioedema, anaphylaxis   Endocrine:  negative for weight changes, polyuria, polydipsia and polyphagia  Musculoskeletal:  Back joint  Pain+  swelling, decreased range of motion  Integumentary: No rashes, skin lesions  Neurological:  negative for headaches, slurred speech, unilateral weakness  Psychiatric: negative for hallucinations,confusion,agitation.                 PHYSICAL EXAM:      Vitals:    BP (!) 143/84   Pulse 66   Temp 98 °F (36.7 °C) (Oral)   Resp 12   Ht 5' 4\" (1.626 m)   Wt 138 lb 3.7 oz (62.7 kg)   SpO2 99%   BMI 23.73 kg/m²     General Appearance: alert,in no acute distress, no pallor, no icterus poor dentition++ marks of self harm on the upper arm   Skin: warm and dry, no rash or erythema  Head: normocephalic and atraumatic  Eyes: pupils equal, round, and reactive to light, conjunctivae normal  ENT: tympanic membrane, external ear and ear canal normal bilaterally, nose without deformity, nasal mucosa and turbinates normal without polyps  Neck: supple and non-tender without mass, no thyromegaly  no cervical lymphadenopathy  Pulmonary/Chest: clear to auscultation bilaterally- no wheezes, rales or rhonchi, normal air movement, no respiratory distress  Cardiovascular: normal rate, regular rhythm, normal S1 and S2, no murmurs, rubs, clicks, or gallops, no carotid bruits  Abdomen: soft, non-tender, non-distended, normal bowel sounds, no masses or organomegaly  Extremities: no cyanosis, clubbing or edema  Musculoskeletal: normal range of motion, no joint swelling, deformity or tenderness  Integumentary: No rashes, no abnormal skin lesions, no petechiae  Neurologic: reflexes normal and symmetric, no cranial nerve deficit  Psych:  Orientation, sensorium, mood normal  Lines:  IV      Data Review:    CBC:   Lab Results   Component Value Date    WBC 6.6 10/26/2020    HGB 13.1 10/26/2020    HCT 39.7 10/26/2020    MCV 83.6 10/26/2020     10/26/2020     RENAL:   Lab Results   Component Value Date    CREATININE 0.5 (L) 10/26/2020    BUN 16 10/26/2020     10/26/2020    K 4.5 10/26/2020    CL 99 10/26/2020    CO2 28 10/26/2020     SED RATE:   Lab Results   Component Value Date    SEDRATE 10 10/25/2020     CK: No results found for: CKTOTAL  CRP:   Lab Results   Component Value Date    CRP 1.8 10/25/2020     Hepatic Function Panel:   Lab Results   Component Value Date    ALKPHOS 97 07/01/2020    ALT 11 07/01/2020    AST 13 07/01/2020    PROT 7.9 07/01/2020    BILITOT <0.2 07/01/2020    LABALBU 3.5 07/01/2020     UA:  Lab Results   Component Value Date    PHUR 7.0 07/02/2020      Urine Microscopic: No results found for: LABCAST, BACTERIA, COMU, HYALCAST, WBCUA, RBCUA, EPIU  Urine Reflex to Culture: No results found for: URRFLXCULT    CRP 1.8     Vanco 10.7       ESR 10       MICRO: cultures reviewed and updated by me   Blood Culture:   Lab Results   Component Value Date    [facility]  10/23/2020     No Growth to date. Any change in status will be called. BLOODCULT2  10/23/2020     No Growth to date. Any change in status will be called.      Collected: 10/25/20 0040        Order Status: Completed  Updated: 10/25/20 0125     Trichomonas Prep  None Seen     Yeast, Wet Prep  None Seen     Clue Cells, Wet Prep  None Seen     WBC, Wet Prep  <1+     RBC, Wet Prep  <1+     Epi Cells  2+     Bacteria  1+     Source Wet Prep  Vaginal    Narrative:      Performed at:   [facility]   [address]   Phone [phone]    C.trachomatis N.gonorrhoeae DNA [[id number]]   Collected: 10/25/20 0040    Order Status: Sent  Specimen: Cervix  Updated: 10/25/20 0114    Wet prep, genital [[id number]]      Order Status: Sent  Specimen: Vaginal     Wet prep, genital [[id number]]      Order Status: Canceled  Specimen: Vaginal     Culture, Blood 2 [[id number]]   Collected: 10/23/20 2128    Order Status: Completed  Specimen: Blood  Updated: 10/24/20 2215     Culture, Blood 2  No Growth to date.  Any change in status will be called. Narrative:      ORDER#: [id number]                          ORDERED BY: Karyle Sleeper   SOURCE: Blood                              COLLECTED:  10/23/20 21:28   ANTIBIOTICS AT [facility] :                      RECEIVED :  10/23/20 21:35   If child <=2 yrs old please draw pediatric bottle. ~Blood Culture #2   Performed at:   [address], [place] [address] [address]   Phone [phone]    Culture, Blood 1 [[id number]]   Collected: 10/23/20 2126    Order Status: Completed  Specimen: Blood  Updated: 10/24/20 2215     Blood Culture, Routine  No Growth to date.  Any change in status will be called. Narrative:      ORDER#: [id number]                          ORDERED BY: Karyle Sleeper   SOURCE: Blood                              COLLECTED:  10/23/20 21:26   ANTIBIOTICS AT [facility] :                      RECEIVED :  10/23/20 21:35   If child <=2 yrs old please draw pediatric bottle. ~Blood Culture #1   Performed at:   [email or web address] [address]   Phone [phone]      Testing Performed By     Simeon Stratton  Name  Director  Address  Valid Date Range    80-ID - [id number] [facility] mild-to-moderate changes lower spine.               All the pertinent images and reports for the current Hospitalization were reviewed by me     Scheduled Meds:   metoprolol tartrate  25 mg Oral BID    sodium chloride flush  10 mL Intravenous 2 times per day    enoxaparin  40 mg Subcutaneous Daily       Continuous Infusions:      PRN Meds:  butalbital-acetaminophen-caffeine, acetaminophen, ibuprofen, oxyCODONE, methocarbamol, sodium chloride flush, perflutren lipid microspheres  Conclusions      Summary   Normal left ventricle size, wall thickness, and systolic function with an   estimated ejection fraction of 55-60%. No regional wall motion abnormalities   are seen. Normal diastolic function. Mild thickening of anterior leaflet of mitral valve. Trivial mitral regurgitation is present. No evidence of mitral valve stenosis. No vegetation or masses are seen on the mitral valve. Normal right ventricular size and function. TAPSE 1.8 cm   No valvular vegetations noted on this study.       Signature      ------------------------------------------------------------------   Electronically signed by Rubin Shaikh MD   (Interpreting physician) on 10/24/2020 at 03:48 PM   ------------------------------------------------------------------      Assessment:     Patient Active Problem List   Diagnosis    Discitis    Spinal abscess ([facility] [address]    IVDU (intravenous drug user)    Chronic midline low back pain without sciatica    Lower back pain    Osteomyelitis of vertebra, lumbosacral region [facility])     Lumbar diskitis and osteomyelitis  Back pain on going   ESR, CRP normal   IVDA  H/o Hospitalization at [facility] in July   S/p CT guided aspiration back in July cx positive for Enterobacter  She has not been on abx since per patient as she was incarcerated  MRI of L spine 10/23 results reviewed  Old records reviewed in detail     I will ask radiology here to compare the scans from July based on the present report there is no progression and feel we can treat her with oral abx given the lack of fevers and ESR, CRP actually being normal       Labs, Microbiology, Radiology and all the pertinent results from current hospitalization and  care every where were reviewed  by me as a part of the evaluation   Plan:   1. Start IV Cefepime as in patient for now   2. I will ask radiology to compare the MRI from out side to here from July 2020  3. Will be able to choose oral Bactrim DS x twice a day for x 6 weeks  4 other option would be Cipro x 750 mg q 12 hr BUT she has Levofloxacin listed as allergy hence avoiding this     Addendum : Radiologist from [facility] spoke to me after the rounds and he compared the MRI scans and did not feel there was any progression but improvement and no epidural abscess but small anterior paraspinal abscess 5 mm     Ok for d/c on Oral abx as above     Will sign off call with Questions     Discussed with patient/Family and Nursing staff     Thanks for allowing me to participate in your patient's care and please call me with any questions or concerns.     Rochelle Kunz MD  Infectious Disease  [facility]) Physician  Phone: [phone]   Fax : [phone]

## 2020-10-26 NOTE — PLAN OF CARE
Problem: Pain:  Goal: Pain level will decrease  Description: Pain level will decrease  77/55/3664 6451 by Chris Katz RN  Outcome: Ongoing  10/25/2020 1201 by Mike Pink RN  Outcome: Ongoing  Goal: Control of acute pain  Description: Control of acute pain  07/38/2960 0835 by Chris Katz RN  Outcome: Ongoing  10/25/2020 1201 by Mike Pink RN  Outcome: Ongoing  Goal: Control of chronic pain  Description: Control of chronic pain  18/00/7866 0029 by Chris Katz RN  Outcome: Ongoing  10/25/2020 1201 by Mike Pink RN  Outcome: Ongoing     Problem: Falls - Risk of:  Goal: Will remain free from falls  Description: Will remain free from falls  58/96/1083 5158 by Chris Katz RN  Outcome: Ongoing  10/25/2020 1201 by Mike Pink RN  Outcome: Ongoing  Goal: Absence of physical injury  Description: Absence of physical injury  78/91/1114 9352 by Chris Katz RN  Outcome: Ongoing  10/25/2020 1201 by Mike Pink RN  Outcome: Ongoing

## 2020-10-26 NOTE — PROGRESS NOTES
Pt c/o worsening back spasms since up and ambulating today. Kadeem Mckeon NP on floor and made aware of pt complaint with new order for muscle relaxant obtained and administered to pt.       Electronically signed by Prisca Winn RN on 62/49/7693 at 10:52 PM

## 2020-10-26 NOTE — PLAN OF CARE
Problem: Pain:  Goal: Pain level will decrease  Description: Pain level will decrease  10/26/2020 0749 by Oneil Jin RN  Outcome: Ongoing   Pain level will decrease. Problem: Pain:  Goal: Control of acute pain  Description: Control of acute pain  10/26/2020 0749 by Oneil Jin RN  Outcome: Ongoing   Patient educated on acute pain. Taught patient to use call light to ask for pain medication. PRN pain medication given for acute pain. Will continue to monitor pain per unit protocol. Problem: Pain:  Goal: Control of chronic pain  Description: Control of chronic pain  10/26/2020 0749 by Oneil Jin RN  Outcome: Ongoing     Problem: Falls - Risk of:  Goal: Will remain free from falls  Description: Will remain free from falls  10/26/2020 0749 by Oneil Jin RN  Outcome: Ongoing   Will remain free from falls. Fall precautions are in place. Call light, telephone and bedside table are within reach.    Problem: Falls - Risk of:  Goal: Absence of physical injury  Description: Absence of physical injury  10/26/2020 0749 by Oneil Jin RN  Outcome: Ongoing

## 2020-10-26 NOTE — PROGRESS NOTES
Patient is wanting to leave AMA due to needing to \"take charge of business with my bank\". This RN informed Oswaldo Talbert NP of patient wanting to leave AMA. This RN educated patient on risks of leaving without being discharged. Patient verbalized understanding. 1415- Patient states will not leave until after speaking with Dr. Haseeb Parikh about antibiotics. This RN will continue to monitor per unit protocols. Electronically signed by Oneil Jin RN on 10/26/2020 at 3:46 PM      8113- Patient is no longer wanting to leave AMA. Patient is not leaving AMA anymore due to pain and would like something stronger for pain. Patient was not able to secure ride to home. This RN perfect served Oswaldo Talbert NP to inform that patient no longer wants to leave AMA due to pain and would like something stronger. Will continue to monitor per unit protocols.  Electronically signed by Oneil Jin RN on 10/26/2020 at 3:47 PM

## 2020-10-26 NOTE — PROGRESS NOTES
Patient is A&O. Patient is resting in bed, awake and quiet. Room air. Side rails are up x2. Fall precautions are in place. Bed is in lowest position. Call light, telephone and bedside table are within reach. VSS taken. AM meds given. Shift assessment completed. Needs met. Will continue to monitor per unit protocols.  Electronically signed by Yue Acuna RN on 10/26/2020 at 9:09 AM
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
## 2020-10-27 VITALS
TEMPERATURE: 98 F | BODY MASS INDEX: 23.67 KG/M2 | DIASTOLIC BLOOD PRESSURE: 83 MMHG | OXYGEN SATURATION: 97 % | WEIGHT: 138.67 LBS | HEIGHT: 64 IN | HEART RATE: 88 BPM | RESPIRATION RATE: 12 BRPM | SYSTOLIC BLOOD PRESSURE: 123 MMHG

## 2020-10-27 LAB
ANION GAP SERPL CALCULATED.3IONS-SCNC: 8 MMOL/L (ref 3–16)
BASOPHILS ABSOLUTE: 0.1 K/UL (ref 0–0.2)
BASOPHILS RELATIVE PERCENT: 0.8 %
BLOOD CULTURE, ROUTINE: NORMAL
BUN BLDV-MCNC: 20 MG/DL (ref 7–20)
CALCIUM SERPL-MCNC: 9.3 MG/DL (ref 8.3–10.6)
CHLORIDE BLD-SCNC: 101 MMOL/L (ref 99–110)
CO2: 28 MMOL/L (ref 21–32)
CREAT SERPL-MCNC: 0.7 MG/DL (ref 0.6–1.1)
CULTURE, BLOOD 2: NORMAL
EOSINOPHILS ABSOLUTE: 0.4 K/UL (ref 0–0.6)
EOSINOPHILS RELATIVE PERCENT: 3.3 %
GFR AFRICAN AMERICAN: >60
GFR NON-AFRICAN AMERICAN: >60
GLUCOSE BLD-MCNC: 96 MG/DL (ref 70–99)
HCT VFR BLD CALC: 36.8 % (ref 36–48)
HEMOGLOBIN: 12.1 G/DL (ref 12–16)
LYMPHOCYTES ABSOLUTE: 2 K/UL (ref 1–5.1)
LYMPHOCYTES RELATIVE PERCENT: 18.2 %
MAGNESIUM: 1.9 MG/DL (ref 1.8–2.4)
MCH RBC QN AUTO: 27.6 PG (ref 26–34)
MCHC RBC AUTO-ENTMCNC: 32.9 G/DL (ref 31–36)
MCV RBC AUTO: 83.8 FL (ref 80–100)
MONOCYTES ABSOLUTE: 0.8 K/UL (ref 0–1.3)
MONOCYTES RELATIVE PERCENT: 7.1 %
NEUTROPHILS ABSOLUTE: 7.7 K/UL (ref 1.7–7.7)
NEUTROPHILS RELATIVE PERCENT: 70.6 %
PDW BLD-RTO: 14.9 % (ref 12.4–15.4)
PLATELET # BLD: 238 K/UL (ref 135–450)
PMV BLD AUTO: 8.6 FL (ref 5–10.5)
POTASSIUM SERPL-SCNC: 4.5 MMOL/L (ref 3.5–5.1)
RBC # BLD: 4.39 M/UL (ref 4–5.2)
SODIUM BLD-SCNC: 137 MMOL/L (ref 136–145)
WBC # BLD: 10.9 K/UL (ref 4–11)

## 2020-10-27 PROCEDURE — 94760 N-INVAS EAR/PLS OXIMETRY 1: CPT

## 2020-10-27 PROCEDURE — 36415 COLL VENOUS BLD VENIPUNCTURE: CPT

## 2020-10-27 PROCEDURE — 6370000000 HC RX 637 (ALT 250 FOR IP): Performed by: INTERNAL MEDICINE

## 2020-10-27 PROCEDURE — 6370000000 HC RX 637 (ALT 250 FOR IP): Performed by: NURSE PRACTITIONER

## 2020-10-27 PROCEDURE — 2580000003 HC RX 258: Performed by: INTERNAL MEDICINE

## 2020-10-27 PROCEDURE — 2580000003 HC RX 258: Performed by: STUDENT IN AN ORGANIZED HEALTH CARE EDUCATION/TRAINING PROGRAM

## 2020-10-27 PROCEDURE — 80048 BASIC METABOLIC PNL TOTAL CA: CPT

## 2020-10-27 PROCEDURE — 83735 ASSAY OF MAGNESIUM: CPT

## 2020-10-27 PROCEDURE — 6360000002 HC RX W HCPCS: Performed by: INTERNAL MEDICINE

## 2020-10-27 PROCEDURE — 85025 COMPLETE CBC W/AUTO DIFF WBC: CPT

## 2020-10-27 RX ORDER — IBUPROFEN 400 MG/1
800 TABLET ORAL EVERY 8 HOURS PRN
Status: DISCONTINUED | OUTPATIENT
Start: 2020-11-01 | End: 2020-10-27 | Stop reason: HOSPADM

## 2020-10-27 RX ORDER — OXYCODONE HYDROCHLORIDE 10 MG/1
10 TABLET ORAL EVERY 4 HOURS PRN
Qty: 10 TABLET | Refills: 0 | Status: SHIPPED | OUTPATIENT
Start: 2020-10-27 | End: 2020-10-29

## 2020-10-27 RX ORDER — SULFAMETHOXAZOLE AND TRIMETHOPRIM 800; 160 MG/1; MG/1
1 TABLET ORAL 2 TIMES DAILY
Qty: 84 TABLET | Refills: 0 | Status: SHIPPED | OUTPATIENT
Start: 2020-10-27 | End: 2020-12-08

## 2020-10-27 RX ORDER — METHOCARBAMOL 500 MG/1
500 TABLET, FILM COATED ORAL 3 TIMES DAILY PRN
Qty: 10 TABLET | Refills: 0 | Status: SHIPPED | OUTPATIENT
Start: 2020-10-27 | End: 2020-10-30

## 2020-10-27 RX ORDER — KETOROLAC TROMETHAMINE 30 MG/ML
30 INJECTION, SOLUTION INTRAMUSCULAR; INTRAVENOUS EVERY 6 HOURS PRN
Status: DISCONTINUED | OUTPATIENT
Start: 2020-10-27 | End: 2020-10-27 | Stop reason: HOSPADM

## 2020-10-27 RX ORDER — METHOCARBAMOL 500 MG/1
500 TABLET, FILM COATED ORAL 3 TIMES DAILY PRN
Status: DISCONTINUED | OUTPATIENT
Start: 2020-10-27 | End: 2020-10-27 | Stop reason: HOSPADM

## 2020-10-27 RX ADMIN — OXYCODONE HYDROCHLORIDE 10 MG: 10 TABLET ORAL at 02:27

## 2020-10-27 RX ADMIN — BUTALBITAL, ACETAMINOPHEN AND CAFFEINE 1 TABLET: 50; 325; 40 TABLET ORAL at 03:53

## 2020-10-27 RX ADMIN — BUTALBITAL, ACETAMINOPHEN AND CAFFEINE 1 TABLET: 50; 325; 40 TABLET ORAL at 13:05

## 2020-10-27 RX ADMIN — METHOCARBAMOL TABLETS 500 MG: 500 TABLET, COATED ORAL at 05:10

## 2020-10-27 RX ADMIN — BUTALBITAL, ACETAMINOPHEN AND CAFFEINE 1 TABLET: 50; 325; 40 TABLET ORAL at 07:57

## 2020-10-27 RX ADMIN — IBUPROFEN 800 MG: 400 TABLET ORAL at 01:21

## 2020-10-27 RX ADMIN — CEFEPIME HYDROCHLORIDE 2 G: 2 INJECTION, POWDER, FOR SOLUTION INTRAVENOUS at 03:53

## 2020-10-27 RX ADMIN — METOPROLOL TARTRATE 25 MG: 25 TABLET, FILM COATED ORAL at 08:00

## 2020-10-27 RX ADMIN — OXYCODONE HYDROCHLORIDE 10 MG: 10 TABLET ORAL at 06:27

## 2020-10-27 RX ADMIN — OXYCODONE HYDROCHLORIDE 10 MG: 10 TABLET ORAL at 11:22

## 2020-10-27 RX ADMIN — SODIUM CHLORIDE, PRESERVATIVE FREE 10 ML: 5 INJECTION INTRAVENOUS at 07:58

## 2020-10-27 ASSESSMENT — PAIN DESCRIPTION - ORIENTATION
ORIENTATION: MID;RIGHT;LEFT
ORIENTATION: MID
ORIENTATION: MID;RIGHT;LEFT
ORIENTATION: MID
ORIENTATION: MID;RIGHT;LEFT

## 2020-10-27 ASSESSMENT — PAIN DESCRIPTION - PAIN TYPE
TYPE: ACUTE PAIN

## 2020-10-27 ASSESSMENT — PAIN DESCRIPTION - ONSET
ONSET: ON-GOING

## 2020-10-27 ASSESSMENT — PAIN DESCRIPTION - FREQUENCY
FREQUENCY: CONTINUOUS
FREQUENCY: INTERMITTENT
FREQUENCY: INTERMITTENT
FREQUENCY: CONTINUOUS

## 2020-10-27 ASSESSMENT — PAIN DESCRIPTION - LOCATION
LOCATION: BACK
LOCATION: BACK
LOCATION: HEAD
LOCATION: BACK
LOCATION: HEAD
LOCATION: BACK

## 2020-10-27 ASSESSMENT — PAIN DESCRIPTION - DESCRIPTORS
DESCRIPTORS: ACHING;DISCOMFORT
DESCRIPTORS: HEADACHE
DESCRIPTORS: ACHING;DISCOMFORT
DESCRIPTORS: HEADACHE

## 2020-10-27 ASSESSMENT — PAIN - FUNCTIONAL ASSESSMENT
PAIN_FUNCTIONAL_ASSESSMENT: ACTIVITIES ARE NOT PREVENTED

## 2020-10-27 ASSESSMENT — PAIN DESCRIPTION - PROGRESSION
CLINICAL_PROGRESSION: GRADUALLY IMPROVING
CLINICAL_PROGRESSION: GRADUALLY WORSENING
CLINICAL_PROGRESSION: GRADUALLY IMPROVING
CLINICAL_PROGRESSION: GRADUALLY WORSENING
CLINICAL_PROGRESSION: GRADUALLY IMPROVING
CLINICAL_PROGRESSION: NOT CHANGED

## 2020-10-27 ASSESSMENT — PAIN SCALES - GENERAL
PAINLEVEL_OUTOF10: 7
PAINLEVEL_OUTOF10: 7
PAINLEVEL_OUTOF10: 6
PAINLEVEL_OUTOF10: 7
PAINLEVEL_OUTOF10: 8
PAINLEVEL_OUTOF10: 7
PAINLEVEL_OUTOF10: 8
PAINLEVEL_OUTOF10: 8
PAINLEVEL_OUTOF10: 9
PAINLEVEL_OUTOF10: 8
PAINLEVEL_OUTOF10: 6

## 2020-10-27 ASSESSMENT — PAIN SCALES - WONG BAKER
WONGBAKER_NUMERICALRESPONSE: 0

## 2020-10-27 NOTE — PLAN OF CARE
Problem: Pain:  Goal: Pain level will decrease  Description: Pain level will decrease  10/27/2020 0740 by Jaja Fields RN  Outcome: Ongoing   Pain level will decrease  Problem: Pain:  Goal: Control of acute pain  Description: Control of acute pain  10/27/2020 0740 by Jaja Fields RN  Outcome: Ongoing   Patient educated on acute pain. Taught patient to use call light to ask for pain medication. PRN pain medication given for acute pain. Will continue to monitor pain per unit protocol. Problem: Pain:  Goal: Control of chronic pain  Description: Control of chronic pain  10/27/2020 0740 by Jaja Fields RN  Outcome: Ongoing     Problem: Falls - Risk of:  Goal: Will remain free from falls  Description: Will remain free from falls  10/27/2020 0740 by Jaja Fields RN  Outcome: Ongoing   Will remain free from falls. Fall precautions are in place. Call light, telephone and bedside table are within reach.    Problem: Falls - Risk of:  Goal: Absence of physical injury  Description: Absence of physical injury  10/27/2020 0740 by Jaja Fields RN  Outcome: Ongoing

## 2020-10-27 NOTE — PROGRESS NOTES
Patient is A&O. Patient is resting in bed, awake and quiet. Room air. Side rails are up x2. Fall precautions are in place. Bed is in lowest position. Patient is UAL. Call light, telephone and bedside table are within reach. VSS taken. AM meds given. Shift assessment completed. Will continue to monitor patient per unit protocols.  Electronically signed by Robyn Aguilar RN on 10/27/2020 at 9:47 AM
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
## 2020-10-27 NOTE — DISCHARGE INSTR - DIET
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
## 2020-10-27 NOTE — CARE COORDINATION
Allie spoke with patient's RN--patient independent no needs. Patient will need to dc home with her medication to facilitate patient continuing to take as prescribed. Allie requested that Dorothy Sim provided shalom meds to patient to ensure patient has meds prior to dc. RN aware.      Electronically signed by Quincy Wilkinson on 10/27/2020 at 10:36 AM

## 2020-10-27 NOTE — PROGRESS NOTES
Pt resting in bed. A&Ox4. Pt c/o back pain overnight, pain meds given per orders. Call light and bedside table within reach.

## 2020-10-27 NOTE — PROGRESS NOTES
Data- discharge order received, pt verbalized agreement to discharge, disposition to previous residence, no needs for HHC/DME. Action- discharge instructions prepared and given to pt, pt verbalized understanding. Medication information packet given r/t NEW and/or CHANGED prescriptions emphasizing name/purpose/side effects, pt verbalized understanding. Discharge instruction summary: Diet- general, Activity- UAL, Primary Care Physician as follows: No primary care provider on file. None f/u appointment, immunizations reviewed and prescription medications filled with retail pharmacy. Reviewed discharge instructions with the patient and family. No further questions at this time. Will continue to monitor per unit protocols. Response- Pt belongings gathered, IV removed without complications. Dry dressing applied. Disposition is home (no HHC/DME needs), transported with family, taken to lobby via w/c w/ belonging, medications and discharge instructions, no complications.    Electronically signed by Sana Lee RN on 10/27/2020 at 1:25 PM
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
## 2022-11-09 PROCEDURE — 99284 EMERGENCY DEPT VISIT MOD MDM: CPT

## 2022-11-09 PROCEDURE — 96374 THER/PROPH/DIAG INJ IV PUSH: CPT

## 2022-11-10 ENCOUNTER — APPOINTMENT (OUTPATIENT)
Dept: CT IMAGING | Facility: HOSPITAL | Age: 37
End: 2022-11-10

## 2022-11-10 ENCOUNTER — HOSPITAL ENCOUNTER (EMERGENCY)
Facility: HOSPITAL | Age: 37
Discharge: HOME | End: 2022-11-10
Attending: STUDENT IN AN ORGANIZED HEALTH CARE EDUCATION/TRAINING PROGRAM | Admitting: STUDENT IN AN ORGANIZED HEALTH CARE EDUCATION/TRAINING PROGRAM

## 2022-11-10 VITALS
RESPIRATION RATE: 18 BRPM | BODY MASS INDEX: 25.61 KG/M2 | HEART RATE: 70 BPM | DIASTOLIC BLOOD PRESSURE: 97 MMHG | HEIGHT: 64 IN | WEIGHT: 150 LBS | OXYGEN SATURATION: 99 % | SYSTOLIC BLOOD PRESSURE: 130 MMHG | TEMPERATURE: 98.6 F

## 2022-11-10 DIAGNOSIS — M54.50 ACUTE BILATERAL LOW BACK PAIN, UNSPECIFIED WHETHER SCIATICA PRESENT: Primary | ICD-10-CM

## 2022-11-10 LAB
ALBUMIN SERPL-MCNC: 4.2 G/DL (ref 3.5–5.2)
ALBUMIN/GLOB SERPL: 1.3 G/DL
ALP SERPL-CCNC: 96 U/L (ref 39–117)
ALT SERPL W P-5'-P-CCNC: 8 U/L (ref 1–33)
ANION GAP SERPL CALCULATED.3IONS-SCNC: 8.9 MMOL/L (ref 5–15)
AST SERPL-CCNC: 13 U/L (ref 1–32)
B-HCG UR QL: NEGATIVE
BASOPHILS # BLD AUTO: 0.08 10*3/MM3 (ref 0–0.2)
BASOPHILS NFR BLD AUTO: 0.8 % (ref 0–1.5)
BILIRUB SERPL-MCNC: 0.2 MG/DL (ref 0–1.2)
BILIRUB UR QL STRIP: NEGATIVE
BUN SERPL-MCNC: 12 MG/DL (ref 6–20)
BUN/CREAT SERPL: 20.3 (ref 7–25)
CALCIUM SPEC-SCNC: 9 MG/DL (ref 8.6–10.5)
CHLORIDE SERPL-SCNC: 103 MMOL/L (ref 98–107)
CLARITY UR: ABNORMAL
CO2 SERPL-SCNC: 25.1 MMOL/L (ref 22–29)
COLOR UR: YELLOW
CREAT SERPL-MCNC: 0.59 MG/DL (ref 0.57–1)
CRP SERPL-MCNC: <0.3 MG/DL (ref 0–0.5)
D-LACTATE SERPL-SCNC: 1.3 MMOL/L (ref 0.5–2)
DEPRECATED RDW RBC AUTO: 43.8 FL (ref 37–54)
EGFRCR SERPLBLD CKD-EPI 2021: 119.2 ML/MIN/1.73
EOSINOPHIL # BLD AUTO: 0.47 10*3/MM3 (ref 0–0.4)
EOSINOPHIL NFR BLD AUTO: 4.6 % (ref 0.3–6.2)
ERYTHROCYTE [DISTWIDTH] IN BLOOD BY AUTOMATED COUNT: 13.9 % (ref 12.3–15.4)
GLOBULIN UR ELPH-MCNC: 3.3 GM/DL
GLUCOSE SERPL-MCNC: 76 MG/DL (ref 65–99)
GLUCOSE UR STRIP-MCNC: NEGATIVE MG/DL
HCT VFR BLD AUTO: 43.1 % (ref 34–46.6)
HGB BLD-MCNC: 13.7 G/DL (ref 12–15.9)
HGB UR QL STRIP.AUTO: NEGATIVE
HOLD SPECIMEN: NORMAL
HOLD SPECIMEN: NORMAL
IMM GRANULOCYTES # BLD AUTO: 0.01 10*3/MM3 (ref 0–0.05)
IMM GRANULOCYTES NFR BLD AUTO: 0.1 % (ref 0–0.5)
KETONES UR QL STRIP: ABNORMAL
LEUKOCYTE ESTERASE UR QL STRIP.AUTO: NEGATIVE
LYMPHOCYTES # BLD AUTO: 2.81 10*3/MM3 (ref 0.7–3.1)
LYMPHOCYTES NFR BLD AUTO: 27.7 % (ref 19.6–45.3)
MCH RBC QN AUTO: 27.3 PG (ref 26.6–33)
MCHC RBC AUTO-ENTMCNC: 31.8 G/DL (ref 31.5–35.7)
MCV RBC AUTO: 86 FL (ref 79–97)
MONOCYTES # BLD AUTO: 0.82 10*3/MM3 (ref 0.1–0.9)
MONOCYTES NFR BLD AUTO: 8.1 % (ref 5–12)
NEUTROPHILS NFR BLD AUTO: 5.96 10*3/MM3 (ref 1.7–7)
NEUTROPHILS NFR BLD AUTO: 58.7 % (ref 42.7–76)
NITRITE UR QL STRIP: NEGATIVE
NRBC BLD AUTO-RTO: 0 /100 WBC (ref 0–0.2)
PH UR STRIP.AUTO: 6 [PH] (ref 5–8)
PLATELET # BLD AUTO: 302 10*3/MM3 (ref 140–450)
PMV BLD AUTO: 10 FL (ref 6–12)
POTASSIUM SERPL-SCNC: 3.9 MMOL/L (ref 3.5–5.2)
PROT SERPL-MCNC: 7.5 G/DL (ref 6–8.5)
PROT UR QL STRIP: NEGATIVE
RBC # BLD AUTO: 5.01 10*6/MM3 (ref 3.77–5.28)
SODIUM SERPL-SCNC: 137 MMOL/L (ref 136–145)
SP GR UR STRIP: 1.03 (ref 1–1.03)
UROBILINOGEN UR QL STRIP: ABNORMAL
WBC NRBC COR # BLD: 10.15 10*3/MM3 (ref 3.4–10.8)
WHOLE BLOOD HOLD COAG: NORMAL
WHOLE BLOOD HOLD SPECIMEN: NORMAL

## 2022-11-10 PROCEDURE — 72131 CT LUMBAR SPINE W/O DYE: CPT

## 2022-11-10 PROCEDURE — 81003 URINALYSIS AUTO W/O SCOPE: CPT | Performed by: PHYSICIAN ASSISTANT

## 2022-11-10 PROCEDURE — 25010000002 KETOROLAC TROMETHAMINE PER 15 MG: Performed by: PHYSICIAN ASSISTANT

## 2022-11-10 PROCEDURE — 83605 ASSAY OF LACTIC ACID: CPT | Performed by: PHYSICIAN ASSISTANT

## 2022-11-10 PROCEDURE — 81025 URINE PREGNANCY TEST: CPT | Performed by: PHYSICIAN ASSISTANT

## 2022-11-10 PROCEDURE — 36415 COLL VENOUS BLD VENIPUNCTURE: CPT

## 2022-11-10 PROCEDURE — 86140 C-REACTIVE PROTEIN: CPT | Performed by: PHYSICIAN ASSISTANT

## 2022-11-10 PROCEDURE — 85025 COMPLETE CBC W/AUTO DIFF WBC: CPT | Performed by: PHYSICIAN ASSISTANT

## 2022-11-10 PROCEDURE — 80053 COMPREHEN METABOLIC PANEL: CPT | Performed by: PHYSICIAN ASSISTANT

## 2022-11-10 PROCEDURE — 87040 BLOOD CULTURE FOR BACTERIA: CPT | Performed by: PHYSICIAN ASSISTANT

## 2022-11-10 RX ORDER — TIZANIDINE 4 MG/1
4 TABLET ORAL EVERY 8 HOURS PRN
Qty: 30 TABLET | Refills: 0 | Status: SHIPPED | OUTPATIENT
Start: 2022-11-10

## 2022-11-10 RX ORDER — KETOROLAC TROMETHAMINE 30 MG/ML
30 INJECTION, SOLUTION INTRAMUSCULAR; INTRAVENOUS ONCE
Status: COMPLETED | OUTPATIENT
Start: 2022-11-10 | End: 2022-11-10

## 2022-11-10 RX ORDER — DICLOFENAC SODIUM 75 MG/1
75 TABLET, DELAYED RELEASE ORAL 2 TIMES DAILY
Qty: 30 TABLET | Refills: 0 | Status: SHIPPED | OUTPATIENT
Start: 2022-11-10 | End: 2022-11-10

## 2022-11-10 RX ORDER — SODIUM CHLORIDE 0.9 % (FLUSH) 0.9 %
10 SYRINGE (ML) INJECTION AS NEEDED
Status: DISCONTINUED | OUTPATIENT
Start: 2022-11-10 | End: 2022-11-10 | Stop reason: HOSPADM

## 2022-11-10 RX ADMIN — KETOROLAC TROMETHAMINE 30 MG: 30 INJECTION, SOLUTION INTRAMUSCULAR at 02:18

## 2022-11-15 LAB
BACTERIA SPEC AEROBE CULT: NORMAL
BACTERIA SPEC AEROBE CULT: NORMAL